# Patient Record
Sex: FEMALE | Race: WHITE | NOT HISPANIC OR LATINO | Employment: OTHER | URBAN - METROPOLITAN AREA
[De-identification: names, ages, dates, MRNs, and addresses within clinical notes are randomized per-mention and may not be internally consistent; named-entity substitution may affect disease eponyms.]

---

## 2017-03-11 ENCOUNTER — LAB (OUTPATIENT)
Dept: FAMILY MEDICINE CLINIC | Facility: CLINIC | Age: 62
End: 2017-03-11

## 2017-03-11 DIAGNOSIS — J45.40 MODERATE PERSISTENT ASTHMA WITHOUT COMPLICATION: ICD-10-CM

## 2017-03-11 DIAGNOSIS — E78.5 DYSLIPIDEMIA: ICD-10-CM

## 2017-03-11 DIAGNOSIS — F90.9 ATTENTION DEFICIT HYPERACTIVITY DISORDER (ADHD), UNSPECIFIED ADHD TYPE: ICD-10-CM

## 2017-03-11 DIAGNOSIS — M85.80 OSTEOPENIA: ICD-10-CM

## 2017-03-11 LAB
25(OH)D3 SERPL-MCNC: 35.7 NG/ML (ref 30–100)
ALBUMIN SERPL-MCNC: 4.3 G/DL (ref 3.5–5.2)
ALBUMIN/GLOB SERPL: 1.7 G/DL
ALP SERPL-CCNC: 52 U/L (ref 39–117)
ALT SERPL W P-5'-P-CCNC: 29 U/L (ref 1–33)
ANION GAP SERPL CALCULATED.3IONS-SCNC: 12.6 MMOL/L
AST SERPL-CCNC: 29 U/L (ref 1–32)
BILIRUB SERPL-MCNC: 0.4 MG/DL (ref 0.1–1.2)
BUN BLD-MCNC: 19 MG/DL (ref 8–23)
BUN/CREAT SERPL: 22.4 (ref 7–25)
CALCIUM SPEC-SCNC: 8.9 MG/DL (ref 8.6–10.5)
CHLORIDE SERPL-SCNC: 103 MMOL/L (ref 98–107)
CHOLEST SERPL-MCNC: 200 MG/DL (ref 0–200)
CO2 SERPL-SCNC: 26.4 MMOL/L (ref 22–29)
CREAT BLD-MCNC: 0.85 MG/DL (ref 0.57–1)
ERYTHROCYTE [DISTWIDTH] IN BLOOD BY AUTOMATED COUNT: 13.1 % (ref 4.5–15)
GFR SERPL CREATININE-BSD FRML MDRD: 68 ML/MIN/1.73
GLOBULIN UR ELPH-MCNC: 2.5 GM/DL
GLUCOSE BLD-MCNC: 97 MG/DL (ref 65–99)
HCT VFR BLD AUTO: 43.1 % (ref 31–42)
HDLC SERPL-MCNC: 88 MG/DL (ref 40–60)
HGB BLD-MCNC: 14.3 G/DL (ref 12–18)
LDLC SERPL CALC-MCNC: 100 MG/DL (ref 0–100)
LDLC/HDLC SERPL: 1.14 {RATIO}
LYMPHOCYTES # BLD AUTO: 0.6 10*3/MM3 (ref 1.2–3.4)
LYMPHOCYTES NFR BLD AUTO: 14.5 % (ref 21–51)
MCH RBC QN AUTO: 29.8 PG (ref 26.1–33.1)
MCHC RBC AUTO-ENTMCNC: 33.3 G/DL (ref 33–37)
MCV RBC AUTO: 89.5 FL (ref 80–99)
MONOCYTES # BLD AUTO: 0.4 10*3/MM3 (ref 0.1–0.6)
MONOCYTES NFR BLD AUTO: 9.7 % (ref 2–9)
NEUTROPHILS # BLD AUTO: 3 10*3/MM3 (ref 1.4–6.5)
NEUTROPHILS NFR BLD AUTO: 75.8 % (ref 42–75)
PLATELET # BLD AUTO: 201 10*3/MM3 (ref 150–450)
PMV BLD AUTO: 8.6 FL (ref 7.1–10.5)
POTASSIUM BLD-SCNC: 4.7 MMOL/L (ref 3.5–5.2)
PROT SERPL-MCNC: 6.8 G/DL (ref 6–8.5)
RBC # BLD AUTO: 4.81 10*6/MM3 (ref 4–6)
SODIUM BLD-SCNC: 142 MMOL/L (ref 136–145)
TRIGL SERPL-MCNC: 59 MG/DL (ref 0–150)
VLDLC SERPL-MCNC: 11.8 MG/DL (ref 5–40)
WBC NRBC COR # BLD: 4 10*3/MM3 (ref 4.5–10)

## 2017-03-11 PROCEDURE — 80061 LIPID PANEL: CPT | Performed by: INTERNAL MEDICINE

## 2017-03-11 PROCEDURE — 82306 VITAMIN D 25 HYDROXY: CPT | Performed by: INTERNAL MEDICINE

## 2017-03-11 PROCEDURE — 85025 COMPLETE CBC W/AUTO DIFF WBC: CPT | Performed by: INTERNAL MEDICINE

## 2017-03-11 PROCEDURE — 80053 COMPREHEN METABOLIC PANEL: CPT | Performed by: INTERNAL MEDICINE

## 2017-03-13 RX ORDER — DEXTROAMPHETAMINE SACCHARATE, AMPHETAMINE ASPARTATE, DEXTROAMPHETAMINE SULFATE AND AMPHETAMINE SULFATE 2.5; 2.5; 2.5; 2.5 MG/1; MG/1; MG/1; MG/1
TABLET ORAL
Qty: 60 TABLET | Refills: 0 | Status: SHIPPED | OUTPATIENT
Start: 2017-03-13 | End: 2017-07-17 | Stop reason: SDUPTHER

## 2017-03-14 ENCOUNTER — TELEPHONE (OUTPATIENT)
Dept: FAMILY MEDICINE CLINIC | Facility: CLINIC | Age: 62
End: 2017-03-14

## 2017-07-05 ENCOUNTER — TELEPHONE (OUTPATIENT)
Dept: FAMILY MEDICINE CLINIC | Facility: CLINIC | Age: 62
End: 2017-07-05

## 2017-07-05 RX ORDER — ZOLPIDEM TARTRATE 10 MG/1
10 TABLET ORAL NIGHTLY PRN
Qty: 30 TABLET | Refills: 3 | Status: SHIPPED | OUTPATIENT
Start: 2017-07-05 | End: 2018-06-25

## 2017-07-05 NOTE — TELEPHONE ENCOUNTER
Recommend [facility] will get 10 free pills 7 and a prescription for refill of one and it also needs coupons for the free medicine and refill needs to follow-up in one month call back if too expensive

## 2017-07-05 NOTE — TELEPHONE ENCOUNTER
PT HAS BEEN HAVING A LOT OF STRESS AT WORK. SHE CAN'T SLEEP FOR IT AND SHE WANTS TO KNOW CAN YOU PLEASE CALL IN A SLEEPING MED TO [facility] AT [facility]? PLEASE CALL

## 2017-07-17 ENCOUNTER — TELEPHONE (OUTPATIENT)
Dept: FAMILY MEDICINE CLINIC | Facility: CLINIC | Age: 62
End: 2017-07-17

## 2017-07-17 RX ORDER — DEXTROAMPHETAMINE SACCHARATE, AMPHETAMINE ASPARTATE, DEXTROAMPHETAMINE SULFATE AND AMPHETAMINE SULFATE 2.5; 2.5; 2.5; 2.5 MG/1; MG/1; MG/1; MG/1
TABLET ORAL
Qty: 60 TABLET | Refills: 0 | Status: SHIPPED | OUTPATIENT
Start: 2017-07-17 | End: 2018-06-26 | Stop reason: SDUPTHER

## 2018-06-25 ENCOUNTER — OFFICE VISIT (OUTPATIENT)
Dept: FAMILY MEDICINE CLINIC | Facility: CLINIC | Age: 63
End: 2018-06-25

## 2018-06-25 VITALS
HEART RATE: 74 BPM | OXYGEN SATURATION: 99 % | BODY MASS INDEX: 22.28 KG/M2 | HEIGHT: 68 IN | SYSTOLIC BLOOD PRESSURE: 90 MMHG | WEIGHT: 147 LBS | DIASTOLIC BLOOD PRESSURE: 82 MMHG | TEMPERATURE: 98.6 F

## 2018-06-25 DIAGNOSIS — R42 DIZZINESS: ICD-10-CM

## 2018-06-25 DIAGNOSIS — R53.82 CHRONIC FATIGUE: ICD-10-CM

## 2018-06-25 DIAGNOSIS — F90.9 ATTENTION DEFICIT HYPERACTIVITY DISORDER (ADHD), UNSPECIFIED ADHD TYPE: Primary | ICD-10-CM

## 2018-06-25 LAB
ALBUMIN SERPL-MCNC: 4.7 G/DL (ref 3.5–5.2)
ALBUMIN/GLOB SERPL: 2 G/DL
ALP SERPL-CCNC: 34 U/L (ref 39–117)
ALT SERPL W P-5'-P-CCNC: 17 U/L (ref 1–33)
ANION GAP SERPL CALCULATED.3IONS-SCNC: 15 MMOL/L
AST SERPL-CCNC: 14 U/L (ref 1–32)
BILIRUB SERPL-MCNC: 0.6 MG/DL (ref 0.1–1.2)
BUN BLD-MCNC: 16 MG/DL (ref 8–23)
BUN/CREAT SERPL: 28.1 (ref 7–25)
CALCIUM SPEC-SCNC: 9.4 MG/DL (ref 8.6–10.5)
CHLORIDE SERPL-SCNC: 99 MMOL/L (ref 98–107)
CO2 SERPL-SCNC: 25 MMOL/L (ref 22–29)
CREAT BLD-MCNC: 0.57 MG/DL (ref 0.57–1)
GFR SERPL CREATININE-BSD FRML MDRD: 107 ML/MIN/1.73
GLOBULIN UR ELPH-MCNC: 2.4 GM/DL
GLUCOSE BLD-MCNC: 86 MG/DL (ref 65–99)
POTASSIUM BLD-SCNC: 4.2 MMOL/L (ref 3.5–5.2)
PROT SERPL-MCNC: 7.1 G/DL (ref 6–8.5)
SODIUM BLD-SCNC: 139 MMOL/L (ref 136–145)
T-UPTAKE NFR SERPL: 1.1 TBI (ref 0.8–1.3)
T4 SERPL-MCNC: 6.81 MCG/DL (ref 4.5–11.7)
TSH SERPL DL<=0.05 MIU/L-ACNC: 1.29 MIU/ML (ref 0.27–4.2)

## 2018-06-25 PROCEDURE — 93000 ELECTROCARDIOGRAM COMPLETE: CPT | Performed by: NURSE PRACTITIONER

## 2018-06-25 PROCEDURE — 80053 COMPREHEN METABOLIC PANEL: CPT | Performed by: NURSE PRACTITIONER

## 2018-06-25 PROCEDURE — 99214 OFFICE O/P EST MOD 30 MIN: CPT | Performed by: NURSE PRACTITIONER

## 2018-06-25 PROCEDURE — 84443 ASSAY THYROID STIM HORMONE: CPT | Performed by: NURSE PRACTITIONER

## 2018-06-25 PROCEDURE — 84436 ASSAY OF TOTAL THYROXINE: CPT | Performed by: NURSE PRACTITIONER

## 2018-06-25 PROCEDURE — 36415 COLL VENOUS BLD VENIPUNCTURE: CPT | Performed by: NURSE PRACTITIONER

## 2018-06-25 PROCEDURE — 84479 ASSAY OF THYROID (T3 OR T4): CPT | Performed by: NURSE PRACTITIONER

## 2018-06-25 NOTE — PROGRESS NOTES
Subjective   Aurora Fisher is a 62 y.o. female.     History of Present Illness   Here to FU on ADHD on adderall 10 mg BID prn, if not working doesn't take and #60 has lasted almost 1 year, Has seen psych in past > 3 years and has been on adderall stable request refill, Works as nurse and helps concentrate, prev had problem with sleepng and tried ambien but no longer takes, With depression and anxiety stable on celexa 20 mg prn and doesn't want to take daily  C/o episode of shakiness, dizziness, and fatigue at home 3 wks ago, no CP, HA, SOA, no sweating or jaw pain, sat down and resolved, then with another episode 1 wk ago with extreme exhaustion and checked BP normal, not associated with meals or exertion, with recent labs 03/1/18 calcium, vit D, CBC, chol well controlled, fam hx of MI and concerned about heart, last EKG 2015 and 2014 here normal, no prev dx of HTN, no prev episode of low BS  Sees Tessa Garner GYN UTD pap on prolia for osteopenia   With asthma stable on advair and albuterol prn and daily singulair 10 mg     The following portions of the patient's history were reviewed and updated as appropriate: allergies, current medications, past family history, past medical history, past social history, past surgical history and problem list.    Review of Systems   Constitutional: Positive for fatigue. Negative for activity change, appetite change, chills, diaphoresis, fever and unexpected weight change.   HENT: Negative for trouble swallowing and voice change.    Respiratory: Negative for cough, shortness of breath and wheezing.    Cardiovascular: Negative for chest pain, palpitations and leg swelling.   Gastrointestinal: Negative for abdominal distention, abdominal pain, anal bleeding, blood in stool, constipation, diarrhea, nausea, rectal pain and vomiting.   Endocrine: Negative for cold intolerance, heat intolerance, polydipsia, polyphagia and polyuria.   Neurological: Positive for dizziness,  weakness and light-headedness. Negative for tremors, seizures, syncope, facial asymmetry, speech difficulty, numbness and headaches.   Psychiatric/Behavioral: Positive for decreased concentration. Negative for agitation, behavioral problems, confusion, dysphoric mood, hallucinations, self-injury, sleep disturbance and suicidal ideas. The patient is not nervous/anxious and is not hyperactive.    All other systems reviewed and are negative.      Objective   Physical Exam   Constitutional: She is oriented to person, place, and time. She appears well-developed and well-nourished.   HENT:   Head: Normocephalic and atraumatic.   Right Ear: Hearing and tympanic membrane normal.   Left Ear: Hearing and tympanic membrane normal.   Nose: Mucosal edema present. Right sinus exhibits no maxillary sinus tenderness and no frontal sinus tenderness. Left sinus exhibits no maxillary sinus tenderness and no frontal sinus tenderness.   Mouth/Throat: No oropharyngeal exudate, posterior oropharyngeal edema or posterior oropharyngeal erythema.   Eyes: Conjunctivae and EOM are normal. Pupils are equal, round, and reactive to light.   Neck: Normal range of motion. Neck supple. No thyromegaly present.   Cardiovascular: Normal rate, regular rhythm and normal heart sounds.    Pulmonary/Chest: Effort normal and breath sounds normal.   Abdominal: Soft. She exhibits no distension and no mass. There is no tenderness. There is no rebound and no guarding. No hernia.   Musculoskeletal: Normal range of motion.   Lymphadenopathy:     She has no cervical adenopathy.   Neurological: She is alert and oriented to person, place, and time.   Skin: Skin is warm and dry.   Psychiatric: She has a normal mood and affect. Her behavior is normal. Judgment and thought content normal.   Vitals reviewed.    ECG 12 Lead  Date/Time: 6/25/2018 5:14 PM  Performed by: TONA PARK  Authorized by: TONA PARK   Comparison: compared with previous ECG  from 12/10/2014  Similar to previous ECG  Rhythm: sinus rhythm  Rate: normal  Conduction: conduction normal  ST Segments: ST segments normal  T Waves: T waves normal  QRS axis: normal  Other: no other findings  Clinical impression: normal ECG          Assessment/Plan   Aurora was seen today for fatigue.    Diagnoses and all orders for this visit:    Attention deficit hyperactivity disorder (ADHD), unspecified ADHD type  -     Compliance Drug Analysis, Ur - Urine, Clean Catch    Chronic fatigue  -     Thyroid Panel With TSH  -     Comprehensive Metabolic Panel    Dizziness    Other orders  -     ECG 12 Lead    check labs and call with results, will discuss adderall with Dr Vazquez, consider viral infection or episodes of low BS and monitor, EKG today NAD, reviewed prev labs 03/18 from GYN, The patient has read and signed the [facility] Controlled Substance Contract UTD today, jamison reviewed and within normal limits.  I will continue to see patient for regular follow up appointments.  They are well controlled on their medication.  JAMISON is updated every 3 months. The patient is aware of the potential for addiction and dependence.

## 2018-06-25 NOTE — PATIENT INSTRUCTIONS
check labs and call with results, will discuss adderall with Dr Vazquez, consider viral infection or episodes of low BS and monitor, EKG today NAD, reviewed prev labs 03/18 from GYN, The patient has read and signed the [facility] Controlled Substance Contract UTD today, rowan reviewed and within normal limits.  I will continue to see patient for regular follow up appointments.  They are well controlled on their medication.  ROWAN is updated every 3 months. The patient is aware of the potential for addiction and dependence.

## 2018-06-26 RX ORDER — DEXTROAMPHETAMINE SACCHARATE, AMPHETAMINE ASPARTATE, DEXTROAMPHETAMINE SULFATE AND AMPHETAMINE SULFATE 2.5; 2.5; 2.5; 2.5 MG/1; MG/1; MG/1; MG/1
TABLET ORAL
Qty: 60 TABLET | Refills: 0 | Status: SHIPPED | OUTPATIENT
Start: 2018-06-26 | End: 2018-10-08 | Stop reason: SDUPTHER

## 2018-06-27 ENCOUNTER — TELEPHONE (OUTPATIENT)
Dept: FAMILY MEDICINE CLINIC | Facility: CLINIC | Age: 63
End: 2018-06-27

## 2018-06-27 NOTE — TELEPHONE ENCOUNTER
Pt informed    ----- Message from BLAYNE June sent at 6/27/2018 10:04 AM EDT -----  BS, kidney, liver, thyroid normal

## 2018-06-29 LAB — CONV REPORT SUMMARY: NORMAL

## 2018-07-19 ENCOUNTER — TELEPHONE (OUTPATIENT)
Dept: GASTROENTEROLOGY | Facility: CLINIC | Age: 63
End: 2018-07-19

## 2018-07-19 NOTE — TELEPHONE ENCOUNTER
Returned patients call. She wanted to know when her next colonoscopy is due. Let her know her recall is May of 2019. She did not have any futher  questions.

## 2018-08-23 ENCOUNTER — OFFICE VISIT (OUTPATIENT)
Dept: FAMILY MEDICINE CLINIC | Facility: CLINIC | Age: 63
End: 2018-08-23

## 2018-08-23 ENCOUNTER — TELEPHONE (OUTPATIENT)
Dept: FAMILY MEDICINE CLINIC | Facility: CLINIC | Age: 63
End: 2018-08-23

## 2018-08-23 VITALS
WEIGHT: 145.2 LBS | HEART RATE: 107 BPM | RESPIRATION RATE: 16 BRPM | HEIGHT: 68 IN | DIASTOLIC BLOOD PRESSURE: 58 MMHG | SYSTOLIC BLOOD PRESSURE: 90 MMHG | TEMPERATURE: 98.4 F | OXYGEN SATURATION: 95 % | BODY MASS INDEX: 22.01 KG/M2
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
## 2018-08-23 PROCEDURE — 99213 OFFICE O/P EST LOW 20 MIN: CPT | Performed by: NURSE PRACTITIONER

## 2018-08-23 RX ORDER — DOXYCYCLINE HYCLATE 100 MG/1
CAPSULE ORAL
COMMUNITY
Start: 2018-08-23 | End: 2018-10-18

## 2018-08-23 RX ORDER — AMOXICILLIN AND CLAVULANATE POTASSIUM 875; 125 MG/1; MG/1
1 TABLET, FILM COATED ORAL 2 TIMES DAILY
Qty: 20 TABLET | Refills: 0 | Status: SHIPPED | OUTPATIENT
Start: 2018-08-23 | End: 2018-09-02

## 2018-08-23 NOTE — TELEPHONE ENCOUNTER
Would recommend her to be seen to make sure it is not infected also received rabies vaccine and tetanus

## 2018-08-23 NOTE — PROGRESS NOTES
Subjective   Aurora Fisher is a 63 y.o. female.     History of Present Illness   C/o cat bite, she states cat is her cat, not UTD on vaccines, states cat has been inside, has not been outdoors recently, she states he bit her left forearm 3 weeks ago, no laceration or lesion noted, she states she has quarantined cat and has been behaving normally since she kept him inside, she is UTD on tdap had in 6/2014, she states she did clean area well. She states bite was yesterday evening, on back of right lower leg, calf region. She denies any drainage, but states redness is getting worse.     The following portions of the patient's history were reviewed and updated as appropriate: allergies, current medications, past family history, past medical history, past social history, past surgical history and problem list.    Review of Systems   Constitutional: Negative for chills, diaphoresis and fever.   Respiratory: Negative for cough and shortness of breath.    Cardiovascular: Negative for chest pain.   Musculoskeletal: Negative for arthralgias and myalgias.   Skin: Positive for color change and skin lesions.   Neurological: Negative for dizziness, light-headedness and headache.   All other systems reviewed and are negative.      Objective   Physical Exam   Constitutional: She is oriented to person, place, and time. She appears well-developed and well-nourished.   HENT:   Head: Normocephalic.   Eyes: Pupils are equal, round, and reactive to light.   Cardiovascular: Normal rate, regular rhythm and normal heart sounds.    Pulmonary/Chest: Effort normal and breath sounds normal.   Musculoskeletal: Normal range of motion.   Neurological: She is alert and oriented to person, place, and time.   Skin: Skin is warm and dry. Abrasion and laceration noted. There is erythema.        Psychiatric: She has a normal mood and affect. Her behavior is normal.   Nursing note and vitals reviewed.        Assessment/Plan   Aurora was seen  today for animal bite.    Diagnoses and all orders for this visit:    Cat bite, initial encounter    Other orders  -     mupirocin (BACTROBAN) 2 % ointment; Apply  topically to the appropriate area as directed 3 (Three) Times a Day.  -     amoxicillin-clavulanate (AUGMENTIN) 875-125 MG per tablet; Take 1 tablet by mouth 2 (Two) Times a Day for 10 days.      Apply bactroban oint to affected area 2x day,   Keep wound clean and dry. Clean with soap and water  Start augmentin 875-125mg 2x day for 10 days, call with any problems  Educated about s/s infection, call with problems or if symptoms persist 5-7 days.  Health department form faxed.   Increase fluid intake, get plenty of rest.   Patient agrees with plan of care and understands instructions. Call if worsening symptoms or any problems or concerns.

## 2018-08-23 NOTE — PATIENT INSTRUCTIONS
Apply bactroban oint to affected area 2x day,   Keep wound clean and dry. Clean with soap and water  Start augmentin 875-125mg 2x day for 10 days, call with any problems  Educated about s/s infection, call with problems or if symptoms persist 5-7 days.  Health department form faxed.   Increase fluid intake, get plenty of rest.   Patient agrees with plan of care and understands instructions. Call if worsening symptoms or any problems or concerns.

## 2018-08-23 NOTE — TELEPHONE ENCOUNTER
PATIENTS CAT BIT HER YESTERDAY AND CATS RABIE SHOTS WAS April 2014. CAT IS AN INSIDE CAT. PATIENT WANTS TO KNOW IF SHE NEEDS TO BE SEEN

## 2018-09-27 ENCOUNTER — TELEPHONE (OUTPATIENT)
Dept: GASTROENTEROLOGY | Facility: CLINIC | Age: 63
End: 2018-09-27

## 2018-09-27 NOTE — TELEPHONE ENCOUNTER
Patient called wanting to do a colonoscopy. Explained to her her last colonoscopy was 6-2014. She is not do for a colonoscopy till 6-2019. Explained if she has doucmenation of a change in bowels. Insurance might pay for colonoscopy sooner. She will need to contact insurance to see what they tell her. I am happy to schedule her a colonoscopy at any time.

## 2018-10-01 ENCOUNTER — TELEPHONE (OUTPATIENT)
Dept: FAMILY MEDICINE CLINIC | Facility: CLINIC | Age: 63
End: 2018-10-01

## 2018-10-01 NOTE — TELEPHONE ENCOUNTER
PT CALLING SHE NEEDS HER ADDERAL 10 MG CALLED INTO [phone]  IF THERE IS A PROBLEM PLEASE CALL HER [phone]

## 2018-10-04 ENCOUNTER — TELEPHONE (OUTPATIENT)
Dept: FAMILY MEDICINE CLINIC | Facility: CLINIC | Age: 63
End: 2018-10-04

## 2018-10-08 RX ORDER — DEXTROAMPHETAMINE SACCHARATE, AMPHETAMINE ASPARTATE, DEXTROAMPHETAMINE SULFATE AND AMPHETAMINE SULFATE 2.5; 2.5; 2.5; 2.5 MG/1; MG/1; MG/1; MG/1
TABLET ORAL
Qty: 60 TABLET | Refills: 0 | Status: SHIPPED | OUTPATIENT
Start: 2018-10-08 | End: 2018-11-02 | Stop reason: SDUPTHER

## 2018-10-18 ENCOUNTER — OFFICE VISIT (OUTPATIENT)
Dept: FAMILY MEDICINE CLINIC | Facility: CLINIC | Age: 63
End: 2018-10-18

## 2018-10-18 VITALS
BODY MASS INDEX: 22.58 KG/M2 | HEIGHT: 68 IN | WEIGHT: 149 LBS | TEMPERATURE: 98.8 F | RESPIRATION RATE: 15 BRPM | DIASTOLIC BLOOD PRESSURE: 70 MMHG | HEART RATE: 63 BPM | OXYGEN SATURATION: 99 % | SYSTOLIC BLOOD PRESSURE: 104 MMHG

## 2018-10-18 DIAGNOSIS — F90.2 ATTENTION DEFICIT HYPERACTIVITY DISORDER (ADHD), COMBINED TYPE: ICD-10-CM

## 2018-10-18 DIAGNOSIS — Z00.00 PHYSICAL EXAM, ANNUAL: Primary | ICD-10-CM

## 2018-10-18 DIAGNOSIS — E78.5 DYSLIPIDEMIA: ICD-10-CM

## 2018-10-18 PROCEDURE — 99396 PREV VISIT EST AGE 40-64: CPT | Performed by: INTERNAL MEDICINE

## 2018-10-18 RX ORDER — MONTELUKAST SODIUM 10 MG/1
10 TABLET ORAL NIGHTLY
Qty: 90 TABLET | Refills: 1 | Status: SHIPPED | OUTPATIENT
Start: 2018-10-18 | End: 2019-07-29 | Stop reason: SDUPTHER

## 2018-10-18 RX ORDER — PHENOL 1.4 %
600 AEROSOL, SPRAY (ML) MUCOUS MEMBRANE DAILY
COMMUNITY

## 2018-10-18 RX ORDER — MELATONIN
1000 2 TIMES DAILY
COMMUNITY

## 2018-10-18 NOTE — PROGRESS NOTES
Subjective   Aurora Fisher is a 63 y.o. female.     History of Present Illness   She was seen for a physical.  Her every level and diet were discussed.  A she has extensive family history of colonic polyps and colon cancer.  Her grandfather  of colon cancer her mother and sister had multiple polyps.  It is recommended patient have colonoscopy every 5 years.    Dictated utilizing Dragon dictation. If there are questions or for further clarification, please contact me.   The following portions of the patient's history were reviewed and updated as appropriate: allergies, current medications, past family history, past medical history, past social history, past surgical history and problem list.    Review of Systems   Constitutional: Negative for fatigue and fever.   HENT: Positive for congestion. Negative for trouble swallowing.    Eyes: Negative for discharge and visual disturbance.   Respiratory: Negative for choking and shortness of breath.    Cardiovascular: Negative for chest pain and palpitations.   Gastrointestinal: Negative for abdominal pain and blood in stool.   Endocrine: Negative.    Genitourinary: Negative for genital sores and hematuria.   Musculoskeletal: Negative for gait problem and joint swelling.   Skin: Negative for color change, pallor, rash and wound.   Allergic/Immunologic: Positive for environmental allergies. Negative for immunocompromised state.   Neurological: Negative for facial asymmetry and speech difficulty.   Psychiatric/Behavioral: Negative for hallucinations and suicidal ideas.       Objective   Physical Exam   Constitutional: She is oriented to person, place, and time. She appears well-developed and well-nourished. No distress.   HENT:   Head: Normocephalic and atraumatic.   Right Ear: External ear normal.   Left Ear: External ear normal.   Nose: Nose normal.   Mouth/Throat: Oropharynx is clear and moist. No oropharyngeal exudate.   Eyes: Pupils are equal, round, and reactive  to light. Conjunctivae and EOM are normal. Right eye exhibits no discharge. Left eye exhibits no discharge. No scleral icterus.   Neck: Normal range of motion. Neck supple. No JVD present. No tracheal deviation present. No thyromegaly present.   Cardiovascular: Normal rate, regular rhythm and normal heart sounds.  Exam reveals no gallop and no friction rub.    No murmur heard.  Pulmonary/Chest: Effort normal and breath sounds normal. No stridor. She has no wheezes. She has no rales. She exhibits no tenderness.   Abdominal: Soft. Bowel sounds are normal. She exhibits no distension and no mass. There is no tenderness. There is no rebound and no guarding. No hernia.   Musculoskeletal: Normal range of motion. She exhibits no edema, tenderness or deformity.   Lymphadenopathy:     She has no cervical adenopathy.   Neurological: She is alert and oriented to person, place, and time. She displays normal reflexes. No sensory deficit. She exhibits normal muscle tone. Coordination normal.   Skin: Skin is warm and dry. No rash noted. She is not diaphoretic. No erythema. No pallor.   Psychiatric: She has a normal mood and affect. Her behavior is normal. Judgment and thought content normal.   Nursing note and vitals reviewed.      Assessment/Plan   Problems Addressed this Visit        Other    ADHD (attention deficit hyperactivity disorder)    Relevant Orders    CBC & Differential    Comprehensive Metabolic Panel    Lipid Panel    Vitamin D 25 Hydroxy    Dyslipidemia    Relevant Orders    CBC & Differential    Comprehensive Metabolic Panel    Lipid Panel    Vitamin D 25 Hydroxy      Other Visit Diagnoses     Physical exam, annual    -  Primary    Relevant Orders    CBC & Differential    Comprehensive Metabolic Panel    Lipid Panel    Vitamin D 25 Hydroxy

## 2018-10-22 LAB
25(OH)D3 SERPL-MCNC: 51.9 NG/ML (ref 30–100)
ALBUMIN SERPL-MCNC: 5 G/DL (ref 3.5–5.2)
ALBUMIN/GLOB SERPL: 2.2 G/DL
ALP SERPL-CCNC: 37 U/L (ref 39–117)
ALT SERPL W P-5'-P-CCNC: 16 U/L (ref 1–33)
ANION GAP SERPL CALCULATED.3IONS-SCNC: 11.7 MMOL/L
AST SERPL-CCNC: 18 U/L (ref 1–32)
BASOPHILS # BLD AUTO: 0.02 10*3/MM3 (ref 0–0.2)
BASOPHILS NFR BLD AUTO: 0.5 % (ref 0–1.5)
BILIRUB SERPL-MCNC: 0.4 MG/DL (ref 0.1–1.2)
BUN BLD-MCNC: 23 MG/DL (ref 8–23)
BUN/CREAT SERPL: 26.7 (ref 7–25)
CALCIUM SPEC-SCNC: 9.5 MG/DL (ref 8.6–10.5)
CHLORIDE SERPL-SCNC: 102 MMOL/L (ref 98–107)
CHOLEST SERPL-MCNC: 233 MG/DL (ref 0–200)
CO2 SERPL-SCNC: 26.3 MMOL/L (ref 22–29)
CREAT BLD-MCNC: 0.86 MG/DL (ref 0.57–1)
DEPRECATED RDW RBC AUTO: 44.3 FL (ref 37–54)
EOSINOPHIL # BLD AUTO: 0.24 10*3/MM3 (ref 0–0.7)
EOSINOPHIL NFR BLD AUTO: 5.9 % (ref 0.3–6.2)
ERYTHROCYTE [DISTWIDTH] IN BLOOD BY AUTOMATED COUNT: 13 % (ref 11.7–13)
GFR SERPL CREATININE-BSD FRML MDRD: 66 ML/MIN/1.73
GLOBULIN UR ELPH-MCNC: 2.3 GM/DL
GLUCOSE BLD-MCNC: 81 MG/DL (ref 65–99)
HCT VFR BLD AUTO: 45.9 % (ref 35.6–45.5)
HDLC SERPL-MCNC: 82 MG/DL (ref 40–60)
HGB BLD-MCNC: 14.8 G/DL (ref 11.9–15.5)
IMM GRANULOCYTES # BLD: 0.02 10*3/MM3 (ref 0–0.03)
IMM GRANULOCYTES NFR BLD: 0.5 % (ref 0–0.5)
LDLC SERPL CALC-MCNC: 140 MG/DL (ref 0–100)
LDLC/HDLC SERPL: 1.71 {RATIO}
LYMPHOCYTES # BLD AUTO: 1.31 10*3/MM3 (ref 0.9–4.8)
LYMPHOCYTES NFR BLD AUTO: 32.4 % (ref 19.6–45.3)
MCH RBC QN AUTO: 30.3 PG (ref 26.9–32)
MCHC RBC AUTO-ENTMCNC: 32.2 G/DL (ref 32.4–36.3)
MCV RBC AUTO: 93.9 FL (ref 80.5–98.2)
MONOCYTES # BLD AUTO: 0.39 10*3/MM3 (ref 0.2–1.2)
MONOCYTES NFR BLD AUTO: 9.7 % (ref 5–12)
NEUTROPHILS # BLD AUTO: 2.08 10*3/MM3 (ref 1.9–8.1)
NEUTROPHILS NFR BLD AUTO: 51.5 % (ref 42.7–76)
PLATELET # BLD AUTO: 284 10*3/MM3 (ref 140–500)
PMV BLD AUTO: 10.9 FL (ref 6–12)
POTASSIUM BLD-SCNC: 4.9 MMOL/L (ref 3.5–5.2)
PROT SERPL-MCNC: 7.3 G/DL (ref 6–8.5)
RBC # BLD AUTO: 4.89 10*6/MM3 (ref 3.9–5.2)
SODIUM BLD-SCNC: 140 MMOL/L (ref 136–145)
TRIGL SERPL-MCNC: 54 MG/DL (ref 0–150)
VLDLC SERPL-MCNC: 10.8 MG/DL (ref 5–40)
WBC NRBC COR # BLD: 4.04 10*3/MM3 (ref 4.5–10.7)

## 2018-10-22 PROCEDURE — 85025 COMPLETE CBC W/AUTO DIFF WBC: CPT | Performed by: INTERNAL MEDICINE

## 2018-10-22 PROCEDURE — 80053 COMPREHEN METABOLIC PANEL: CPT | Performed by: INTERNAL MEDICINE

## 2018-10-22 PROCEDURE — 80061 LIPID PANEL: CPT | Performed by: INTERNAL MEDICINE

## 2018-10-22 PROCEDURE — 82306 VITAMIN D 25 HYDROXY: CPT | Performed by: INTERNAL MEDICINE

## 2018-11-02 ENCOUNTER — TELEPHONE (OUTPATIENT)
Dept: FAMILY MEDICINE CLINIC | Facility: CLINIC | Age: 63
End: 2018-11-02

## 2018-11-02 RX ORDER — DEXTROAMPHETAMINE SACCHARATE, AMPHETAMINE ASPARTATE, DEXTROAMPHETAMINE SULFATE AND AMPHETAMINE SULFATE 2.5; 2.5; 2.5; 2.5 MG/1; MG/1; MG/1; MG/1
TABLET ORAL
Qty: 60 TABLET | Refills: 0 | Status: SHIPPED | OUTPATIENT
Start: 2018-11-02 | End: 2018-12-07 | Stop reason: SDUPTHER

## 2018-12-07 ENCOUNTER — TELEPHONE (OUTPATIENT)
Dept: FAMILY MEDICINE CLINIC | Facility: CLINIC | Age: 63
End: 2018-12-07

## 2018-12-07 RX ORDER — DEXTROAMPHETAMINE SACCHARATE, AMPHETAMINE ASPARTATE, DEXTROAMPHETAMINE SULFATE AND AMPHETAMINE SULFATE 2.5; 2.5; 2.5; 2.5 MG/1; MG/1; MG/1; MG/1
TABLET ORAL
Qty: 60 TABLET | Refills: 0 | Status: SHIPPED | OUTPATIENT
Start: 2018-12-07 | End: 2019-07-29

## 2018-12-07 NOTE — TELEPHONE ENCOUNTER
PATIENT HAD HER LABS DONE AND HER LDL IS ELAVATED PATIENT WANTS TO KNOW IF SHE SHOULD BE ON MEDICATION FOR IT

## 2018-12-07 NOTE — TELEPHONE ENCOUNTER
Needs refill on her adderall wants it sent to [facility] today BC she is out of her medicine now

## 2019-01-18 ENCOUNTER — TELEPHONE (OUTPATIENT)
Dept: FAMILY MEDICINE CLINIC | Facility: CLINIC | Age: 64
End: 2019-01-18

## 2019-01-18 DIAGNOSIS — E78.2 MIXED HYPERLIPIDEMIA: Primary | ICD-10-CM

## 2019-01-18 RX ORDER — ATORVASTATIN CALCIUM 40 MG/1
40 TABLET, FILM COATED ORAL DAILY
Qty: 30 TABLET | Refills: 3 | Status: SHIPPED | OUTPATIENT
Start: 2019-01-18 | End: 2019-07-29 | Stop reason: SDUPTHER

## 2019-01-18 NOTE — TELEPHONE ENCOUNTER
PATIENTS CHOLESTEROL IS UP AND WANTS TO KNOW IF SHE NEEDS TO BE ON MEDICATION. PATIENT HAS FAMILY HISTORY OF HIGH CHOLESTEROL

## 2019-01-21 NOTE — TELEPHONE ENCOUNTER
Patient informed she wanted to know if there is a better cholesterol medicine than others she was thinking of crestor but wanted your opinion I already told her you prescribed liptor and would not be back in office until next Tuesday.

## 2019-02-14 ENCOUNTER — TELEPHONE (OUTPATIENT)
Dept: FAMILY MEDICINE CLINIC | Facility: CLINIC | Age: 64
End: 2019-02-14

## 2019-02-14 NOTE — TELEPHONE ENCOUNTER
Would like her cholesterol numbers again before she starts lipitor, she said you can call or email at   [email or web address]

## 2019-03-20 ENCOUNTER — PREP FOR SURGERY (OUTPATIENT)
Dept: OTHER | Facility: HOSPITAL | Age: 64
End: 2019-03-20

## 2019-03-20 ENCOUNTER — TELEPHONE (OUTPATIENT)
Dept: GASTROENTEROLOGY | Facility: CLINIC | Age: 64
End: 2019-03-20

## 2019-03-20 DIAGNOSIS — Z86.010 HISTORY OF COLON POLYPS: Primary | ICD-10-CM

## 2019-03-20 PROBLEM — Z86.0100 HISTORY OF COLON POLYPS: Status: ACTIVE | Noted: 2019-03-20

## 2019-03-20 RX ORDER — SODIUM CHLORIDE, SODIUM LACTATE, POTASSIUM CHLORIDE, CALCIUM CHLORIDE 600; 310; 30; 20 MG/100ML; MG/100ML; MG/100ML; MG/100ML
30 INJECTION, SOLUTION INTRAVENOUS CONTINUOUS
Status: CANCELLED | OUTPATIENT
Start: 2019-05-31

## 2019-03-20 NOTE — TELEPHONE ENCOUNTER
Patient called did Open Access health history paper work mailed. Colonoscopy scheduled for 4-18-19. She is to arrive at 8 am. Patient has recall colonoscopy do in June, 2019. She wants to do it now as she is unemployed. She has [employer]. Explained to her I was not sure if insurance will cove since she is doing colonoscopy early. She did mention she is having some rectal bleeding. There is no documentation of this. Colonoscopy scheduled for 4-18-19.

## 2019-03-26 ENCOUNTER — PREP FOR SURGERY (OUTPATIENT)
Dept: OTHER | Facility: HOSPITAL | Age: 64
End: 2019-03-26

## 2019-04-03 ENCOUNTER — TELEPHONE (OUTPATIENT)
Dept: GASTROENTEROLOGY | Facility: CLINIC | Age: 64
End: 2019-04-03

## 2019-04-03 NOTE — TELEPHONE ENCOUNTER
----- Message from Emely Aguuste RN sent at 4/2/2019 12:31 PM EDT -----  Regarding: RESCHED CSCOPE  Contact: [phone]  She has colonoscopy sched 4/18/19 but needs to resched because her sister is having surgery.  Returned patients call. Moved her procedure from 4-18-19. To 4-26-19. She will arrive at 7.30 am.

## 2019-04-19 ENCOUNTER — TELEPHONE (OUTPATIENT)
Dept: GASTROENTEROLOGY | Facility: CLINIC | Age: 64
End: 2019-04-19

## 2019-04-25 ENCOUNTER — TELEPHONE (OUTPATIENT)
Dept: GASTROENTEROLOGY | Facility: CLINIC | Age: 64
End: 2019-04-25

## 2019-04-25 NOTE — TELEPHONE ENCOUNTER
Patient called moved procedure from 4-26-19 to 5-31-19. She will arrive at 8.30 am. She is feeling worse today. Moved her with Inga PICKENS.

## 2019-05-24 ENCOUNTER — TELEPHONE (OUTPATIENT)
Dept: GASTROENTEROLOGY | Facility: CLINIC | Age: 64
End: 2019-05-24

## 2019-05-31 ENCOUNTER — HOSPITAL ENCOUNTER (OUTPATIENT)
Facility: HOSPITAL | Age: 64
Setting detail: HOSPITAL OUTPATIENT SURGERY
Discharge: HOME | End: 2019-05-31
Attending: INTERNAL MEDICINE | Admitting: INTERNAL MEDICINE

## 2019-05-31 ENCOUNTER — ANESTHESIA (OUTPATIENT)
Dept: GASTROENTEROLOGY | Facility: HOSPITAL | Age: 64
End: 2019-05-31

## 2019-05-31 ENCOUNTER — ANESTHESIA EVENT (OUTPATIENT)
Dept: GASTROENTEROLOGY | Facility: HOSPITAL | Age: 64
End: 2019-05-31

## 2019-05-31 VITALS
HEIGHT: 68 IN | WEIGHT: 158 LBS | HEART RATE: 78 BPM | RESPIRATION RATE: 16 BRPM | BODY MASS INDEX: 23.95 KG/M2 | DIASTOLIC BLOOD PRESSURE: 76 MMHG | OXYGEN SATURATION: 95 % | SYSTOLIC BLOOD PRESSURE: 105 MMHG

## 2019-05-31 DIAGNOSIS — Z86.010 HISTORY OF COLON POLYPS: ICD-10-CM

## 2019-05-31 PROCEDURE — 45378 DIAGNOSTIC COLONOSCOPY: CPT | Performed by: INTERNAL MEDICINE

## 2019-05-31 PROCEDURE — 25010000002 PROPOFOL 10 MG/ML EMULSION: Performed by: NURSE ANESTHETIST, CERTIFIED REGISTERED

## 2019-05-31 RX ORDER — LIDOCAINE HYDROCHLORIDE 20 MG/ML
INJECTION, SOLUTION INFILTRATION; PERINEURAL AS NEEDED
Status: DISCONTINUED | OUTPATIENT
Start: 2019-05-31 | End: 2019-05-31 | Stop reason: SURG

## 2019-05-31 RX ORDER — ASPIRIN 81 MG/1
81 TABLET, CHEWABLE ORAL DAILY
COMMUNITY
End: 2021-11-04

## 2019-05-31 RX ORDER — SODIUM CHLORIDE, SODIUM LACTATE, POTASSIUM CHLORIDE, CALCIUM CHLORIDE 600; 310; 30; 20 MG/100ML; MG/100ML; MG/100ML; MG/100ML
30 INJECTION, SOLUTION INTRAVENOUS CONTINUOUS
Status: DISCONTINUED | OUTPATIENT
Start: 2019-05-31 | End: 2019-05-31 | Stop reason: HOSPADM

## 2019-05-31 RX ORDER — PROPOFOL 10 MG/ML
VIAL (ML) INTRAVENOUS CONTINUOUS PRN
Status: DISCONTINUED | OUTPATIENT
Start: 2019-05-31 | End: 2019-05-31 | Stop reason: SURG

## 2019-05-31 RX ORDER — PROPOFOL 10 MG/ML
VIAL (ML) INTRAVENOUS AS NEEDED
Status: DISCONTINUED | OUTPATIENT
Start: 2019-05-31 | End: 2019-05-31 | Stop reason: SURG

## 2019-05-31 RX ORDER — SODIUM CHLORIDE, SODIUM LACTATE, POTASSIUM CHLORIDE, CALCIUM CHLORIDE 600; 310; 30; 20 MG/100ML; MG/100ML; MG/100ML; MG/100ML
30 INJECTION, SOLUTION INTRAVENOUS CONTINUOUS PRN
Status: DISCONTINUED | OUTPATIENT
Start: 2019-05-31 | End: 2019-05-31 | Stop reason: HOSPADM

## 2019-05-31 RX ADMIN — PROPOFOL 100 MG: 10 INJECTION, EMULSION INTRAVENOUS at 11:23

## 2019-05-31 RX ADMIN — LIDOCAINE HYDROCHLORIDE 60 MG: 20 INJECTION, SOLUTION INFILTRATION; PERINEURAL at 11:18

## 2019-05-31 RX ADMIN — SODIUM CHLORIDE, POTASSIUM CHLORIDE, SODIUM LACTATE AND CALCIUM CHLORIDE 30 ML/HR: 600; 310; 30; 20 INJECTION, SOLUTION INTRAVENOUS at 10:05

## 2019-05-31 RX ADMIN — PROPOFOL 50 MG: 10 INJECTION, EMULSION INTRAVENOUS at 11:24

## 2019-05-31 RX ADMIN — PROPOFOL 300 MCG/KG/MIN: 10 INJECTION, EMULSION INTRAVENOUS at 11:24

## 2019-05-31 NOTE — ANESTHESIA POSTPROCEDURE EVALUATION
"Patient: Aurora Fisher    Procedure Summary     Date:  05/31/19 Room / Location:  [facility] ENDOSCOPY 1 /  [facility]    Anesthesia Start:  1114 Anesthesia Stop:  1146    Procedure:  COLONOSCOPY into cecum (N/A ) Diagnosis:       History of colon polyps      (History of colon polyps [Z86.010])    Surgeon:  Sid Menard MD Provider:  Nicole Miller MD    Anesthesia Type:  MAC ASA Status:  2          Anesthesia Type: MAC  Last vitals  BP   105/76 (05/31/19 1204)   Temp       Pulse   78 (05/31/19 1204)   Resp   16 (05/31/19 1204)     SpO2   95 % (05/31/19 1204)     Post Anesthesia Care and Evaluation    Patient location during evaluation: PACU  Patient participation: complete - patient participated  Level of consciousness: awake  Pain score: 0  Pain management: adequate  Airway patency: patent  Anesthetic complications: No anesthetic complications    Cardiovascular status: acceptable  Respiratory status: acceptable  Hydration status: acceptable    Comments: Blood pressure 105/76, pulse 78, resp. rate 16, height 172.7 cm (68\"), weight 71.7 kg (158 lb), SpO2 95 %.    No anesthesia care post op    "

## 2019-05-31 NOTE — ANESTHESIA PREPROCEDURE EVALUATION
Anesthesia Evaluation     Patient summary reviewed and Nursing notes reviewed   NPO Solid Status: > 8 hours  NPO Liquid Status: > 8 hours           Airway   Mallampati: I  TM distance: >3 FB  Neck ROM: full  No difficulty expected  Dental - normal exam     Pulmonary - negative pulmonary ROS and normal exam   Cardiovascular - negative cardio ROS and normal exam        Neuro/Psych- negative ROS  GI/Hepatic/Renal/Endo - negative ROS     Musculoskeletal (-) negative ROS    Abdominal    Substance History - negative use     OB/GYN negative ob/gyn ROS         Other                        Anesthesia Plan    ASA 2     MAC     Anesthetic plan, all risks, benefits, and alternatives have been provided, discussed and informed consent has been obtained with: patient.

## 2019-07-25 ENCOUNTER — TELEPHONE (OUTPATIENT)
Dept: FAMILY MEDICINE CLINIC | Facility: CLINIC | Age: 64
End: 2019-07-25

## 2019-07-25 NOTE — TELEPHONE ENCOUNTER
Informed patient hasn't been in since October, needs visit,UDS,Contract to get refill on singular lipitor adderal advair provential inhaler. Scheduling appt informed may have to see some one else

## 2019-07-29 ENCOUNTER — OFFICE VISIT (OUTPATIENT)
Dept: FAMILY MEDICINE CLINIC | Facility: CLINIC | Age: 64
End: 2019-07-29

## 2019-07-29 VITALS
HEIGHT: 68 IN | BODY MASS INDEX: 23.78 KG/M2 | SYSTOLIC BLOOD PRESSURE: 102 MMHG | WEIGHT: 156.9 LBS | DIASTOLIC BLOOD PRESSURE: 60 MMHG | TEMPERATURE: 98.5 F | HEART RATE: 75 BPM | OXYGEN SATURATION: 97 %

## 2019-07-29 DIAGNOSIS — E78.5 HYPERLIPIDEMIA, UNSPECIFIED HYPERLIPIDEMIA TYPE: ICD-10-CM

## 2019-07-29 DIAGNOSIS — F90.0 ATTENTION DEFICIT HYPERACTIVITY DISORDER (ADHD), PREDOMINANTLY INATTENTIVE TYPE: Primary | ICD-10-CM

## 2019-07-29 DIAGNOSIS — Z51.81 MEDICATION MONITORING ENCOUNTER: ICD-10-CM

## 2019-07-29 LAB
ALBUMIN SERPL-MCNC: 4.9 G/DL (ref 3.5–5.2)
ALBUMIN/GLOB SERPL: 2 G/DL
ALP SERPL-CCNC: 39 U/L (ref 39–117)
ALT SERPL W P-5'-P-CCNC: 17 U/L (ref 1–33)
ANION GAP SERPL CALCULATED.3IONS-SCNC: 13.5 MMOL/L (ref 5–15)
AST SERPL-CCNC: 18 U/L (ref 1–32)
BILIRUB SERPL-MCNC: 0.7 MG/DL (ref 0.2–1.2)
BUN BLD-MCNC: 16 MG/DL (ref 8–23)
BUN/CREAT SERPL: 23.9 (ref 7–25)
CALCIUM SPEC-SCNC: 9.4 MG/DL (ref 8.6–10.5)
CHLORIDE SERPL-SCNC: 102 MMOL/L (ref 98–107)
CO2 SERPL-SCNC: 25.5 MMOL/L (ref 22–29)
CREAT BLD-MCNC: 0.67 MG/DL (ref 0.57–1)
ERYTHROCYTE [DISTWIDTH] IN BLOOD BY AUTOMATED COUNT: 13.1 % (ref 12.3–15.4)
GFR SERPL CREATININE-BSD FRML MDRD: 89 ML/MIN/1.73
GLOBULIN UR ELPH-MCNC: 2.4 GM/DL
GLUCOSE BLD-MCNC: 95 MG/DL (ref 65–99)
HCT VFR BLD AUTO: 44.6 % (ref 34–46.6)
HGB BLD-MCNC: 14.9 G/DL (ref 12–15.9)
LYMPHOCYTES # BLD AUTO: 1.5 10*3/MM3 (ref 0.7–3.1)
LYMPHOCYTES NFR BLD AUTO: 27.4 % (ref 19.6–45.3)
MCH RBC QN AUTO: 30.2 PG (ref 26.6–33)
MCHC RBC AUTO-ENTMCNC: 33.5 G/DL (ref 31.5–35.7)
MCV RBC AUTO: 90.3 FL (ref 79–97)
MONOCYTES # BLD AUTO: 0.5 10*3/MM3 (ref 0.1–0.9)
MONOCYTES NFR BLD AUTO: 9.9 % (ref 5–12)
NEUTROPHILS # BLD AUTO: 3.4 10*3/MM3 (ref 1.7–7)
NEUTROPHILS NFR BLD AUTO: 62.7 % (ref 42.7–76)
PLATELET # BLD AUTO: 268 10*3/MM3 (ref 140–450)
PMV BLD AUTO: 7.1 FL (ref 6–12)
POTASSIUM BLD-SCNC: 4.2 MMOL/L (ref 3.5–5.2)
PROT SERPL-MCNC: 7.3 G/DL (ref 6–8.5)
RBC # BLD AUTO: 4.93 10*6/MM3 (ref 3.77–5.28)
SODIUM BLD-SCNC: 141 MMOL/L (ref 136–145)
WBC NRBC COR # BLD: 5.4 10*3/MM3 (ref 3.4–10.8)

## 2019-07-29 PROCEDURE — 80053 COMPREHEN METABOLIC PANEL: CPT | Performed by: INTERNAL MEDICINE

## 2019-07-29 PROCEDURE — 36415 COLL VENOUS BLD VENIPUNCTURE: CPT | Performed by: INTERNAL MEDICINE

## 2019-07-29 PROCEDURE — 85025 COMPLETE CBC W/AUTO DIFF WBC: CPT | Performed by: INTERNAL MEDICINE

## 2019-07-29 PROCEDURE — 99214 OFFICE O/P EST MOD 30 MIN: CPT | Performed by: INTERNAL MEDICINE

## 2019-07-29 RX ORDER — ESTRADIOL 2 MG/1
RING VAGINAL AS NEEDED
COMMUNITY
Start: 2019-07-03 | End: 2021-11-04

## 2019-07-29 RX ORDER — ACYCLOVIR 400 MG/1
400 TABLET ORAL DAILY
Qty: 90 TABLET | Refills: 3 | Status: SHIPPED | OUTPATIENT
Start: 2019-07-29 | End: 2019-07-30 | Stop reason: SDUPTHER

## 2019-07-29 RX ORDER — ATORVASTATIN CALCIUM 40 MG/1
40 TABLET, FILM COATED ORAL DAILY
Qty: 90 TABLET | Refills: 3 | Status: SHIPPED | OUTPATIENT
Start: 2019-07-29 | End: 2020-02-26 | Stop reason: SDUPTHER

## 2019-07-29 RX ORDER — ACYCLOVIR 400 MG/1
400 TABLET ORAL DAILY
COMMUNITY
End: 2019-07-29 | Stop reason: SDUPTHER

## 2019-07-29 RX ORDER — PRAMIPEXOLE DIHYDROCHLORIDE 1 MG/1
1 TABLET ORAL DAILY
Qty: 90 TABLET | Refills: 3 | Status: SHIPPED | OUTPATIENT
Start: 2019-07-29 | End: 2019-07-29 | Stop reason: SDUPTHER

## 2019-07-29 RX ORDER — MONTELUKAST SODIUM 10 MG/1
10 TABLET ORAL NIGHTLY
Qty: 90 TABLET | Refills: 3 | Status: SHIPPED | OUTPATIENT
Start: 2019-07-29 | End: 2022-11-16

## 2019-07-29 RX ORDER — ALBUTEROL SULFATE 90 UG/1
2 AEROSOL, METERED RESPIRATORY (INHALATION) EVERY 6 HOURS PRN
Qty: 3 INHALER | Refills: 3 | Status: SHIPPED | OUTPATIENT
Start: 2019-07-29 | End: 2019-07-29 | Stop reason: SDUPTHER

## 2019-07-29 RX ORDER — ACYCLOVIR 400 MG/1
400 TABLET ORAL DAILY
Qty: 90 TABLET | Refills: 3 | Status: SHIPPED | OUTPATIENT
Start: 2019-07-29 | End: 2019-07-29 | Stop reason: SDUPTHER

## 2019-07-29 RX ORDER — MONTELUKAST SODIUM 10 MG/1
10 TABLET ORAL NIGHTLY
Qty: 90 TABLET | Refills: 3 | Status: SHIPPED | OUTPATIENT
Start: 2019-07-29 | End: 2019-07-29 | Stop reason: SDUPTHER

## 2019-07-29 RX ORDER — ATORVASTATIN CALCIUM 40 MG/1
40 TABLET, FILM COATED ORAL DAILY
Qty: 90 TABLET | Refills: 3 | Status: SHIPPED | OUTPATIENT
Start: 2019-07-29 | End: 2019-07-29 | Stop reason: SDUPTHER

## 2019-07-29 RX ORDER — PRAMIPEXOLE DIHYDROCHLORIDE 1 MG/1
1 TABLET ORAL DAILY
Qty: 90 TABLET | Refills: 3 | Status: SHIPPED | OUTPATIENT
Start: 2019-07-29 | End: 2022-11-16

## 2019-07-29 RX ORDER — ALBUTEROL SULFATE 90 UG/1
2 AEROSOL, METERED RESPIRATORY (INHALATION) EVERY 6 HOURS PRN
Qty: 3 INHALER | Refills: 3 | Status: SHIPPED | OUTPATIENT
Start: 2019-07-29

## 2019-07-29 RX ORDER — DEXTROAMPHETAMINE SACCHARATE, AMPHETAMINE ASPARTATE, DEXTROAMPHETAMINE SULFATE AND AMPHETAMINE SULFATE 2.5; 2.5; 2.5; 2.5 MG/1; MG/1; MG/1; MG/1
TABLET ORAL
Qty: 60 TABLET | Refills: 0 | Status: SHIPPED | OUTPATIENT
Start: 2019-07-29 | End: 2021-11-04

## 2019-07-29 NOTE — PROGRESS NOTES
Subjective   Aurora Fisher is a 63 y.o. female.   Patient with known ADD and needs medication monitoring for above.  Is still works fairly well.  Needs lipids checked as well.  History of Present Illness   ADD well controlled on present dose of Adderall.  Needs medication monitoring for this.  Wish to have this done and sees going to be in between jobs in the very near future.  Lipids to be rechecked as well.  History of hyperlipidemia no other complaints at this point time tolerating medications well breathing is satisfactory as well osteopenia being monitored currently is on Prolia for this.  Patient is non-smoker regalis her penicillin causing rash sulfa causing rash.  Family history i noncontributory.  Review of Systems   All other systems reviewed and are negative.      Objective   Vitals:    07/29/19 1427   BP: 102/60   Pulse: 75   Temp: 98.5 °F (36.9 °C)   SpO2: 97%   Weight: 71.2 kg (156 lb 14.4 oz)     Physical Exam   Constitutional: She appears well-developed and well-nourished.   HENT:   Head: Normocephalic and atraumatic.   Eyes: Conjunctivae are normal. Pupils are equal, round, and reactive to light.   Cardiovascular: Normal rate, regular rhythm and normal heart sounds.   Pulmonary/Chest: Effort normal and breath sounds normal.   Abdominal: Soft. Bowel sounds are normal.   Neurological: She is alert.   Unremarkable gait and station   Skin: Skin is warm and dry.   Nursing note and vitals reviewed.      No results found for: INR    Procedures    Assessment/Plan 1.  ADD continue present Adderall    2.  Hyperlipidemia await pending lab further recommendations follow recheck in 6 months continue Lipitor    3.  Medication monitoring with CBC and CMP recheck in 6 months    Much of this encounter note is an electronic transcription/translation of spoken language to printed text.  The electronic translation of spoken language may permit erroneous, or at times, nonsensical words or phrases to be  inadvertently transcribed.  Although I have reviewed the note for such errors, some may still exist. If there are questions or for further clarification, please contact me.    uds  contract

## 2019-07-30 ENCOUNTER — TELEPHONE (OUTPATIENT)
Dept: FAMILY MEDICINE CLINIC | Facility: CLINIC | Age: 64
End: 2019-07-30

## 2019-07-30 RX ORDER — ACYCLOVIR 400 MG/1
TABLET ORAL
Qty: 90 TABLET | Refills: 3 | Status: SHIPPED | OUTPATIENT
Start: 2019-07-30

## 2019-07-30 NOTE — TELEPHONE ENCOUNTER
PHARMACY NEEDS MORE INFO ABOUT THE acyclovir (ZOVIRAX) 400 MG tablet , PATIENT STATES HER INSURANCE RUNS OUT TOMM SO IT NEEDS TO BE DONE ASAP. SAW BTI YESTERDAY

## 2019-08-01 LAB — CONV REPORT SUMMARY: NORMAL

## 2019-09-03 ENCOUNTER — PREP FOR SURGERY (OUTPATIENT)
Dept: OTHER | Facility: HOSPITAL | Age: 64
End: 2019-09-03

## 2019-09-03 DIAGNOSIS — Z12.11 ENCOUNTER FOR SCREENING FOR MALIGNANT NEOPLASM OF COLON: Primary | ICD-10-CM

## 2019-09-03 RX ORDER — SODIUM CHLORIDE, SODIUM LACTATE, POTASSIUM CHLORIDE, CALCIUM CHLORIDE 600; 310; 30; 20 MG/100ML; MG/100ML; MG/100ML; MG/100ML
30 INJECTION, SOLUTION INTRAVENOUS CONTINUOUS
Status: CANCELLED | OUTPATIENT
Start: 2019-09-03

## 2020-01-30 ENCOUNTER — TRANSCRIBE ORDERS (OUTPATIENT)
Dept: ADMINISTRATIVE | Facility: HOSPITAL | Age: 65
End: 2020-01-30

## 2020-01-30 DIAGNOSIS — M81.0 SENILE OSTEOPOROSIS: Primary | ICD-10-CM

## 2020-02-11 PROBLEM — M81.0 OSTEOPOROSIS: Status: ACTIVE | Noted: 2020-02-11

## 2020-02-14 ENCOUNTER — INFUSION (OUTPATIENT)
Dept: ONCOLOGY | Facility: HOSPITAL | Age: 65
End: 2020-02-14

## 2020-02-26 RX ORDER — ATORVASTATIN CALCIUM 40 MG/1
40 TABLET, FILM COATED ORAL DAILY
Qty: 90 TABLET | Refills: 3 | Status: SHIPPED | OUTPATIENT
Start: 2020-02-26 | End: 2020-08-12 | Stop reason: ALTCHOICE

## 2020-02-26 NOTE — TELEPHONE ENCOUNTER
PATIENT HAD A LIPID PANEL DRAWN AT HER OB-GYN, AT DR. RAMOS.   SHE WANTS TO KNOW IF WE HAVE RECEIVED A COPY OF THEM .     PATIENT WOULD LIKE TO HAVE THE PRESCRIPTION SENT TO HER MAIL ORDER PHARMACY THROUGH [employer].      SHE NEEDS A REFILL ON HER LIPITOR.     PATIENT CALLBACK [phone]

## 2020-06-09 ENCOUNTER — TELEPHONE (OUTPATIENT)
Dept: FAMILY MEDICINE CLINIC | Facility: CLINIC | Age: 65
End: 2020-06-09

## 2020-06-09 NOTE — TELEPHONE ENCOUNTER
PATIENT CALLED TO CHECK WITH DR SAMPSON IF FOR SOME REASON HE KNOWS A THERAPIST AROUND HER AGE (50'S OR 60'S) WHO SHE CAN SEE.   SHE WAS CHECKING ON THE [employer] WEBSITE, AND ALL THE THERAPISTS AVAILABLE WERE TOO YOUNG. SHE WOULD LIKE TO SPEAK TO SOMEONE WHO IS AROUND THE SAME AGE.    PLEASE ADVISE

## 2020-06-11 ENCOUNTER — TELEPHONE (OUTPATIENT)
Dept: FAMILY MEDICINE CLINIC | Facility: CLINIC | Age: 65
End: 2020-06-11

## 2020-06-11 RX ORDER — NITROFURANTOIN 25; 75 MG/1; MG/1
100 CAPSULE ORAL 2 TIMES DAILY
Qty: 14 CAPSULE | Refills: 0 | Status: SHIPPED | OUTPATIENT
Start: 2020-06-11 | End: 2020-08-26

## 2020-06-11 NOTE — TELEPHONE ENCOUNTER
Patient is calling back to check the status of her request for an antibiotic for a UTI.    Please advise.      Patient call back [phone]

## 2020-06-11 NOTE — TELEPHONE ENCOUNTER
Patient calling and states she is having frequent urination and cramping as well as a low grade fever. Would like something called in to help. Verified [facility] on [address].    Patient call back is [phone].

## 2020-07-04 ENCOUNTER — TELEPHONE (OUTPATIENT)
Dept: URGENT CARE | Facility: CLINIC | Age: 65
End: 2020-07-04

## 2020-07-04 NOTE — TELEPHONE ENCOUNTER
Spoke to patient informed her of her negative Covid 19 test results and that she needs to still quarantine for the full 10 days, she stated she understood

## 2020-08-11 ENCOUNTER — TELEPHONE (OUTPATIENT)
Dept: FAMILY MEDICINE CLINIC | Facility: CLINIC | Age: 65
End: 2020-08-11

## 2020-08-11 NOTE — TELEPHONE ENCOUNTER
PT GOT HER RESULTS AND NOW WANTS TO KNOW IF SHE SHOULD CONTINUE TO TAKE HER atorvastatin (LIPITOR) 40 MG tablet  PTS AST 46 AND ALT OF 97      PT CALL BACK  [phone]

## 2020-08-11 NOTE — TELEPHONE ENCOUNTER
Patient informed no results here from OBGYN.  She will have them faxed and make a follow up appt.[email or web address]

## 2020-08-11 NOTE — TELEPHONE ENCOUNTER
Pt informed we have not received results yet  She said they were concerned about ast-46 alt-97  Should she continue lipitor?

## 2020-08-12 NOTE — TELEPHONE ENCOUNTER
ALT is 97 and AST is 46.  Would recommend to stop atorvastatin and recheck in 1 month   Pt informed

## 2020-08-13 ENCOUNTER — TELEPHONE (OUTPATIENT)
Dept: FAMILY MEDICINE CLINIC | Facility: CLINIC | Age: 65
End: 2020-08-13

## 2020-08-13 NOTE — TELEPHONE ENCOUNTER
PATIENT HAS PROLIA INJECTION SCHEDULED 8/18 AND IS CONCERNED IF IT IS STILL OK FOR HER TO GET DUE TO ABNORMAL LABS    PATIENT ALSO HAS APPOINTMENT ON 8/26 AND WANTS TO KNOW IF CAN SCHEDULE LABS PRIOR TO APPOINTMENT SO THAT RESULTS COULD BE DISCUSSED AT APPOINTMENT.    PATIENT CALL BACK NUMBER:  [phone]

## 2020-08-18 ENCOUNTER — APPOINTMENT (OUTPATIENT)
Dept: ONCOLOGY | Facility: HOSPITAL | Age: 65
End: 2020-08-18

## 2020-08-24 ENCOUNTER — LAB (OUTPATIENT)
Dept: FAMILY MEDICINE CLINIC | Facility: CLINIC | Age: 65
End: 2020-08-24

## 2020-08-24 DIAGNOSIS — E78.5 DYSLIPIDEMIA: ICD-10-CM

## 2020-08-24 DIAGNOSIS — E78.5 DYSLIPIDEMIA: Primary | ICD-10-CM

## 2020-08-24 LAB
ALBUMIN SERPL-MCNC: 4.5 G/DL (ref 3.5–5.2)
ALBUMIN/GLOB SERPL: 2.3 G/DL
ALP SERPL-CCNC: 40 U/L (ref 39–117)
ALT SERPL W P-5'-P-CCNC: 37 U/L (ref 1–33)
ANION GAP SERPL CALCULATED.3IONS-SCNC: 11 MMOL/L (ref 5–15)
AST SERPL-CCNC: 23 U/L (ref 1–32)
BILIRUB SERPL-MCNC: 0.5 MG/DL (ref 0–1.2)
BUN SERPL-MCNC: 14 MG/DL (ref 8–23)
BUN/CREAT SERPL: 18.2 (ref 7–25)
CALCIUM SPEC-SCNC: 8.8 MG/DL (ref 8.6–10.5)
CHLORIDE SERPL-SCNC: 104 MMOL/L (ref 98–107)
CHOLEST SERPL-MCNC: 215 MG/DL (ref 0–200)
CO2 SERPL-SCNC: 25 MMOL/L (ref 22–29)
CREAT SERPL-MCNC: 0.77 MG/DL (ref 0.57–1)
GFR SERPL CREATININE-BSD FRML MDRD: 75 ML/MIN/1.73
GLOBULIN UR ELPH-MCNC: 2 GM/DL
GLUCOSE SERPL-MCNC: 94 MG/DL (ref 65–99)
HDLC SERPL-MCNC: 79 MG/DL (ref 40–60)
LDLC SERPL CALC-MCNC: 121 MG/DL (ref 0–100)
LDLC/HDLC SERPL: 1.53 {RATIO}
POTASSIUM SERPL-SCNC: 4.2 MMOL/L (ref 3.5–5.2)
PROT SERPL-MCNC: 6.5 G/DL (ref 6–8.5)
SODIUM SERPL-SCNC: 140 MMOL/L (ref 136–145)
TRIGL SERPL-MCNC: 74 MG/DL (ref 0–150)
VLDLC SERPL-MCNC: 14.8 MG/DL (ref 5–40)

## 2020-08-24 PROCEDURE — 80053 COMPREHEN METABOLIC PANEL: CPT | Performed by: NURSE PRACTITIONER

## 2020-08-24 PROCEDURE — 36415 COLL VENOUS BLD VENIPUNCTURE: CPT | Performed by: NURSE PRACTITIONER

## 2020-08-24 PROCEDURE — 80061 LIPID PANEL: CPT | Performed by: NURSE PRACTITIONER

## 2020-08-25 DIAGNOSIS — R73.9 ELEVATED BLOOD SUGAR: Primary | ICD-10-CM

## 2020-08-26 ENCOUNTER — OFFICE VISIT (OUTPATIENT)
Dept: FAMILY MEDICINE CLINIC | Facility: CLINIC | Age: 65
End: 2020-08-26

## 2020-08-26 VITALS
HEART RATE: 66 BPM | HEIGHT: 68 IN | BODY MASS INDEX: 23.49 KG/M2 | DIASTOLIC BLOOD PRESSURE: 62 MMHG | OXYGEN SATURATION: 100 % | WEIGHT: 155 LBS | TEMPERATURE: 98 F | SYSTOLIC BLOOD PRESSURE: 104 MMHG

## 2020-08-26 DIAGNOSIS — R79.89 ELEVATED LFTS: ICD-10-CM

## 2020-08-26 DIAGNOSIS — E78.5 DYSLIPIDEMIA: ICD-10-CM

## 2020-08-26 DIAGNOSIS — Z00.00 MEDICARE ANNUAL WELLNESS VISIT, SUBSEQUENT: Primary | ICD-10-CM

## 2020-08-26 DIAGNOSIS — F41.9 ANXIETY: ICD-10-CM

## 2020-08-26 PROCEDURE — 96160 PT-FOCUSED HLTH RISK ASSMT: CPT | Performed by: INTERNAL MEDICINE

## 2020-08-26 PROCEDURE — G0439 PPPS, SUBSEQ VISIT: HCPCS | Performed by: INTERNAL MEDICINE

## 2020-08-26 RX ORDER — HYDROXYZINE HYDROCHLORIDE 25 MG/1
25 TABLET, FILM COATED ORAL EVERY 8 HOURS PRN
Qty: 30 TABLET | Refills: 0 | Status: SHIPPED | OUTPATIENT
Start: 2020-08-26 | End: 2022-11-16

## 2020-08-26 RX ORDER — ROSUVASTATIN CALCIUM 10 MG/1
10 TABLET, COATED ORAL DAILY
Qty: 30 TABLET | Refills: 3 | Status: SHIPPED | OUTPATIENT
Start: 2020-08-26 | End: 2020-09-25 | Stop reason: SDUPTHER

## 2020-08-26 NOTE — PATIENT INSTRUCTIONS
Medicare Wellness  Personal Prevention Plan of Service     Date of Office Visit:  2020  Encounter Provider:  José Miguel Vazquez MD  Place of Service:  [facility]  Patient Name: Aurora Fisher  :  1955    As part of the Medicare Wellness portion of your visit today, we are providing you with this personalized preventive plan of services (PPPS). This plan is based upon recommendations of the [place] Preventive Services Task Force (USPSTF) and the Advisory Committee on Immunization Practices (ACIP).    This lists the preventive care services that should be considered, and provides dates of when you are due. Items listed as completed are up-to-date and do not require any further intervention.    Health Maintenance   Topic Date Due   • TDAP/TD VACCINES (1 - Tdap) 1965   • HEPATITIS C SCREENING  2016   • Pneumococcal Vaccine Once at 65 Years Old  2019   • INFLUENZA VACCINE  2020   • DXA SCAN  2021   • MAMMOGRAM  2021   • LIPID PANEL  2021   • MEDICARE ANNUAL WELLNESS  2021   • COLONOSCOPY  2029   • ZOSTER VACCINE  Completed       Orders Placed This Encounter   Procedures   • US Abdomen Complete     Standing Status:   Future     Standing Expiration Date:   2021     Order Specific Question:   Reason for Exam:     Answer:   elevated LFT'S       No follow-ups on file.

## 2020-08-26 NOTE — PROGRESS NOTES
Subsequent Medicare Wellness Visit   The ABC's of the Annual Wellness Visit    Chief Complaint   Patient presents with   • Results     bloodwork       HPI:  Aurora Fisher, -1955, is a 65 y.o. female who presents for a Subsequent Medicare Wellness Visit.  Patient was seen for Medicare wellness exam.  Patient was seen for elevated LFTs.  Patient had LFTs done in her OB/GYN office and she stopped Lipitor and alcohol.  Liver enzymes went from AST of 46 and ALT of 97 to AST of 23 and ALT of 37.  Crestor 10 mg was restarted and LFTs will be checked in 1 month.  Ultrasound of liver was obtained to rule out fatty liver.  With a statin her triglycerides were 74, , .  Without the statin the LDL was 140.  Patient has undergone a divorce and after reviewing old folder outcomes started developing severe panic attacks.  Patient called her sister and was able to calm down.  Patient was given hydroxyzine 25 mg p.o. every 8 as needed anxiety.    Dictated utilizing Dragon dictation. If there are questions or for further clarification, please contact me.  Recent Hospitalizations:  No hospitalization(s) within the last year..    Current Medical Providers:  Patient Care Team:  José Miguel Vazquez MD as PCP - General    Health Habits and Functional and Cognitive Screening and Depression Screening:  Functional & Cognitive Status 2020   Do you have difficulty preparing food and eating? No   Do you have difficulty bathing yourself, getting dressed or grooming yourself? No   Do you have difficulty using the toilet? No   Do you have difficulty moving around from place to place? No   Do you have trouble with steps or getting out of a bed or a chair? No   Current Diet Well Balanced Diet   Dental Exam Up to date   Eye Exam Up to date   Exercise (times per week) 3 times per week   Current Exercise Activities Include Aerobics   Do you need help using the phone?  No   Are you deaf or do you have serious difficulty  hearing?  No   Do you need help with transportation? No   Do you need help shopping? No   Do you need help preparing meals?  No   Do you need help with housework?  No   Do you need help with laundry? No   Do you need help taking your medications? No   Do you need help managing money? No   Do you ever drive or ride in a car without wearing a seat belt? No   Have you felt unusual stress, anger or loneliness in the last month? No   Who do you live with? Alone   If you need help, do you have trouble finding someone available to you? No   Have you been bothered in the last four weeks by sexual problems? No   Do you have difficulty concentrating, remembering or making decisions? No       Compared to one year ago, the patient feels her physical health is the same and her mental health is the same.    Depression Screen:  PHQ-2/PHQ-9 Depression Screening 8/26/2020   Little interest or pleasure in doing things 0   Feeling down, depressed, or hopeless 0   Trouble falling or staying asleep, or sleeping too much 0   Feeling tired or having little energy 0   Poor appetite or overeating 0   Feeling bad about yourself - or that you are a failure or have let yourself or your family down 0   Trouble concentrating on things, such as reading the newspaper or watching television 0   Moving or speaking so slowly that other people could have noticed. Or the opposite - being so fidgety or restless that you have been moving around a lot more than usual 0   Thoughts that you would be better off dead, or of hurting yourself in some way 0   Total Score 0   If you checked off any problems, how difficult have these problems made it for you to do your work, take care of things at home, or get along with other people? Not difficult at all         Past Medical/Family/Social History:  The following portions of the patient's history were reviewed and updated as appropriate: allergies, current medications, past family history, past medical history,  past social history, past surgical history and problem list.    Allergies   Allergen Reactions   • Penicillins Rash   • Sulfa Antibiotics Rash         Current Outpatient Medications:   •  acyclovir (ZOVIRAX) 400 MG tablet, Daily use, Disp: 90 tablet, Rfl: 3  •  ADVAIR DISKUS 250-50 MCG/DOSE DISKUS, Inhale 1 puff 2 (Two) Times a Day., Disp: 180 each, Rfl: 3  •  albuterol sulfate  (90 Base) MCG/ACT inhaler, Inhale 2 puffs Every 6 (Six) Hours As Needed for Wheezing., Disp: 3 inhaler, Rfl: 3  •  amphetamine-dextroamphetamine (ADDERALL) 10 MG tablet, 1 twice a day for ADHD, Disp: 60 tablet, Rfl: 0  •  aspirin 81 MG chewable tablet, Chew 81 mg Daily., Disp: , Rfl:   •  calcium carbonate (OS-KILLIAN) 600 MG tablet, Take 600 mg by mouth Daily., Disp: , Rfl:   •  cholecalciferol (VITAMIN D3) 1000 units tablet, Take 1,000 Units by mouth 2 (Two) Times a Day., Disp: , Rfl:   •  ESTRING 2 MG vaginal ring, , Disp: , Rfl:   •  montelukast (SINGULAIR) 10 MG tablet, Take 1 tablet by mouth Every Night., Disp: 90 tablet, Rfl: 3  •  pramipexole (MIRAPEX) 1 MG tablet, Take 1 tablet by mouth Daily., Disp: 90 tablet, Rfl: 3  •  PROLIA 60 MG/ML solution syringe, One injection every 6 months, Disp: , Rfl:   •  hydrOXYzine (ATARAX) 25 MG tablet, Take 1 tablet by mouth Every 8 (Eight) Hours As Needed for Anxiety (Do not drive)., Disp: 30 tablet, Rfl: 0  •  rosuvastatin (Crestor) 10 MG tablet, Take 1 tablet by mouth Daily., Disp: 30 tablet, Rfl: 3    Aspirin use counseling: Taking ASA but at inappropriate dose (instructed to take NA mg ECASA daily)    Current medication list contains no high risk medications.  No harmful drug interactions have been identified.     History reviewed. No pertinent family history.    Social History     Tobacco Use   • Smoking status: Never Smoker   • Smokeless tobacco: Never Used   Substance Use Topics   • Alcohol use: Yes     Comment: occasional       Past Surgical History:   Procedure Laterality Date   •  "APPENDECTOMY     • COLONOSCOPY      2-3 years    • COLONOSCOPY N/A 5/31/2019    Procedure: COLONOSCOPY into cecum;  Surgeon: Sid Menard MD;  Location: [facility];  Service: Gastroenterology       Patient Active Problem List   Diagnosis   • Osteopenia   • Cataract   • Asthma   • Allergic   • ADHD (attention deficit hyperactivity disorder)   • Prominent abdominal aortic pulsation   • Dyslipidemia   • History of colon polyps   • Osteoporosis       Review of Systems   Constitutional: Negative for fatigue and fever.   HENT: Positive for congestion. Negative for trouble swallowing.    Eyes: Negative for discharge and visual disturbance.   Respiratory: Negative for choking and shortness of breath.    Cardiovascular: Negative for chest pain and palpitations.   Gastrointestinal: Negative for abdominal pain and blood in stool.   Endocrine: Negative.    Genitourinary: Negative for genital sores and hematuria.   Musculoskeletal: Negative for gait problem and joint swelling.   Skin: Negative for color change, pallor, rash and wound.   Allergic/Immunologic: Positive for environmental allergies. Negative for immunocompromised state.   Neurological: Negative for facial asymmetry and speech difficulty.   Psychiatric/Behavioral: Negative for hallucinations and suicidal ideas. The patient is nervous/anxious.        Objective     Vitals:    08/26/20 1635   BP: 104/62   BP Location: Left arm   Patient Position: Sitting   Cuff Size: Adult   Pulse: 66   Temp: 98 °F (36.7 °C)   TempSrc: Infrared   SpO2: 100%   Weight: 70.3 kg (155 lb)   Height: 172.7 cm (67.99\")   PainSc: 0-No pain       Patient's Body mass index is 23.57 kg/m². BMI is within normal parameters. No follow-up required..      No exam data present    The patient has no evidence of cognitve impairment.     Physical Exam   Constitutional: She is oriented to person, place, and time. She appears well-developed and well-nourished.   HENT:   Head: Normocephalic and " atraumatic.   Right Ear: External ear normal.   Left Ear: External ear normal.   Nose: Nose normal.   Mouth/Throat: Oropharynx is clear and moist.   Eyes: Pupils are equal, round, and reactive to light. Conjunctivae and EOM are normal.   Neck: Normal range of motion. Neck supple.   Cardiovascular: Normal rate, regular rhythm, normal heart sounds and intact distal pulses.   Pulmonary/Chest: Effort normal and breath sounds normal.   Abdominal: Soft. Bowel sounds are normal.   Musculoskeletal: Normal range of motion.   Neurological: She is alert and oriented to person, place, and time.   Skin: Skin is warm and dry.   Psychiatric: Her behavior is normal. Judgment and thought content normal.   Moderate anxiety with panic attacks       Recent Lab Results:     Lab Results   Component Value Date    CHOL 215 (H) 08/24/2020    TRIG 74 08/24/2020    HDL 79 (H) 08/24/2020    VLDL 14.8 08/24/2020    LDLHDL 1.53 08/24/2020       Assessment/Plan #1 hydroxyzine 25 mg p.o. every 8.  #2 Crestor 10 mg daily #3 LFTs 1 month #4 cholesterol profile 3 months #5 low cholesterol diet and low impact exercise 30 minutes 3 5 times a week.  Age-appropriate Screening Schedule:  Refer to the list below for future screening recommendations based on patient's age, sex and/or medical conditions.      Health Maintenance   Topic Date Due   • TDAP/TD VACCINES (1 - Tdap) 08/11/1965   • INFLUENZA VACCINE  08/01/2020   • DXA SCAN  03/01/2021   • MAMMOGRAM  07/30/2021   • LIPID PANEL  08/24/2021   • COLONOSCOPY  05/31/2029   • ZOSTER VACCINE  Completed       Medicare Risks and Personalized Health Plan:  NA      CMS-Preventive Services Quick Reference  Medicare Preventive Services Addressed:  NA    Advance Care Planning:  ACP discussion was held with the patient during this visit. Patient has an advance directive in EMR which is still valid.     Diagnoses and all orders for this visit:    1. Medicare annual wellness visit, subsequent (Primary)    2.  Dyslipidemia    3. Elevated LFTs  -     US Abdomen Complete; Future    4. Anxiety    Other orders  -     rosuvastatin (Crestor) 10 MG tablet; Take 1 tablet by mouth Daily.  Dispense: 30 tablet; Refill: 3  -     hydrOXYzine (ATARAX) 25 MG tablet; Take 1 tablet by mouth Every 8 (Eight) Hours As Needed for Anxiety (Do not drive).  Dispense: 30 tablet; Refill: 0        An After Visit Summary and PPPS with all of these plans were given to the patient.      Follow Up:  Return in about 3 months (around 11/26/2020), or if symptoms worsen or fail to improve, for Recheck.

## 2020-09-04 ENCOUNTER — HOSPITAL ENCOUNTER (OUTPATIENT)
Dept: ULTRASOUND IMAGING | Facility: HOSPITAL | Age: 65
Discharge: HOME | End: 2020-09-04
Admitting: INTERNAL MEDICINE

## 2020-09-04 DIAGNOSIS — R79.89 ELEVATED LFTS: ICD-10-CM

## 2020-09-04 PROCEDURE — 76700 US EXAM ABDOM COMPLETE: CPT

## 2020-09-08 DIAGNOSIS — D18.03 HEMANGIOMA OF LIVER: Primary | ICD-10-CM

## 2020-09-22 ENCOUNTER — TELEPHONE (OUTPATIENT)
Dept: FAMILY MEDICINE CLINIC | Facility: CLINIC | Age: 65
End: 2020-09-22

## 2020-09-22 RX ORDER — DOXYCYCLINE HYCLATE 100 MG
100 TABLET ORAL 2 TIMES DAILY
Qty: 20 TABLET | Refills: 0 | Status: SHIPPED | OUTPATIENT
Start: 2020-09-22 | End: 2020-10-21

## 2020-09-23 ENCOUNTER — LAB (OUTPATIENT)
Dept: FAMILY MEDICINE CLINIC | Facility: CLINIC | Age: 65
End: 2020-09-23

## 2020-09-23 LAB
ANION GAP SERPL CALCULATED.3IONS-SCNC: 10.4 MMOL/L (ref 5–15)
BUN SERPL-MCNC: 8 MG/DL (ref 8–23)
BUN/CREAT SERPL: 9.5 (ref 7–25)
CALCIUM SPEC-SCNC: 8.5 MG/DL (ref 8.6–10.5)
CHLORIDE SERPL-SCNC: 105 MMOL/L (ref 98–107)
CO2 SERPL-SCNC: 25.6 MMOL/L (ref 22–29)
CREAT SERPL-MCNC: 0.84 MG/DL (ref 0.57–1)
GFR SERPL CREATININE-BSD FRML MDRD: 68 ML/MIN/1.73
GLUCOSE SERPL-MCNC: 90 MG/DL (ref 65–99)
HBA1C MFR BLD: 5.3 % (ref 4.8–5.6)
POTASSIUM SERPL-SCNC: 4.1 MMOL/L (ref 3.5–5.2)
SODIUM SERPL-SCNC: 141 MMOL/L (ref 136–145)

## 2020-09-23 PROCEDURE — 83036 HEMOGLOBIN GLYCOSYLATED A1C: CPT | Performed by: INTERNAL MEDICINE

## 2020-09-23 PROCEDURE — 80048 BASIC METABOLIC PNL TOTAL CA: CPT | Performed by: INTERNAL MEDICINE

## 2020-09-23 PROCEDURE — 36415 COLL VENOUS BLD VENIPUNCTURE: CPT | Performed by: INTERNAL MEDICINE

## 2020-09-25 ENCOUNTER — TELEPHONE (OUTPATIENT)
Dept: FAMILY MEDICINE CLINIC | Facility: CLINIC | Age: 65
End: 2020-09-25

## 2020-09-25 RX ORDER — ROSUVASTATIN CALCIUM 10 MG/1
10 TABLET, COATED ORAL DAILY
Qty: 90 TABLET | Refills: 1 | Status: SHIPPED | OUTPATIENT
Start: 2020-09-25 | End: 2021-02-18

## 2020-09-25 RX ORDER — ROSUVASTATIN CALCIUM 10 MG/1
10 TABLET, COATED ORAL DAILY
Qty: 30 TABLET | Refills: 3 | Status: SHIPPED | OUTPATIENT
Start: 2020-09-25 | End: 2020-09-25 | Stop reason: SDUPTHER

## 2020-09-25 NOTE — TELEPHONE ENCOUNTER
These were results you already did in august, she was supposed to come back and have them rechecked on 9/24  You ordered bmp  She was supposed to have cmp and lipid

## 2020-09-25 NOTE — TELEPHONE ENCOUNTER
PT CALLED AGAIN TO FOLLOW UP ON HAVING HER RESULTS POSTED ON [employer] SO SHE CAN LOOK AT THE INDIVIDUAL NUMBERS.     SHE REQUESTS THAT WE POST BEFORE WE HEAD INTO THE WEEKEND.    SHE ALSO REQUESTS THE NAME AND CONTACT INFO FOR THE GASTROENTEROLOGIST SHE IS BEING REFERRED TO.    SHE ALSO WANTS DR. SAMPSON TO KNOW SHE GOT HER FLU AND PNEUMONIA SHOTS TODAY.

## 2020-09-25 NOTE — TELEPHONE ENCOUNTER
PATIENT CALLED AND WANTED TO GET THE LAB RESULTS FROM 9/23/20. SHE WANTED HER LIVER ENZYME RESULTS.     ACCORDING TO THE PATIENT UPON RECEIPT OF THE RESULTS SHE MAY NEED ANOTHER PRESCRIPTION FOR HER  rosuvastatin (Crestor) 10 MG tablet    PATIENT CONFIRMED [facility] MAIL ORDER FOR MEDICATION REFILL      PATIENT CONFIRMED CALL BACK NUMBER [phone]

## 2020-09-25 NOTE — TELEPHONE ENCOUNTER
I do not know how to post the labs.  Hemoglobin A1c was 5.8%.  Blood sugar was 94.  LDL was 122.  Rest the labs are normal.  Patient was seen at [facility] see his gastroenterologist but the referral system will determine who you get

## 2020-09-28 ENCOUNTER — TELEPHONE (OUTPATIENT)
Dept: FAMILY MEDICINE CLINIC | Facility: CLINIC | Age: 65
End: 2020-09-28

## 2020-09-28 DIAGNOSIS — R79.89 ELEVATED LFTS: Primary | ICD-10-CM

## 2020-09-28 NOTE — TELEPHONE ENCOUNTER
Patient called-she stated she was to be referred to gastroenterology for hemangioma of liver, and wants to know status of scheduling that appointment    Patient also stated she was supposed to have had a comprehensive metabolic panel and lipid panel on 9/23 and instead got A1C and basic metabolic panel. She would like order for comprehensive met panel and lipid panel but does not want to be charged for the basic met panel and A1C    Patient call back:   [phone]

## 2020-09-28 NOTE — TELEPHONE ENCOUNTER
The hemangioma is hereditary and can not be fixed . The lipid profile was drawn in august and insurance will not pay for three months

## 2020-09-30 NOTE — TELEPHONE ENCOUNTER
PATIENT CALLING BACK REGARDING THE BELOW REQUEST ON LAB ORDER, PATIENT INDICATED SHE HAS CRESTOR ORDERED AND WANTED TO GET THE LAB NEEDED TO CHECK  HER ENZYMES SO IS AWARE IF THE DOSAGE OF CRESTOR IS WORKING OR NOT?  PATIENT STATES SHE NEEDS LAB ORDER FOR CHOLESTEROL AND COMPLETE METABOLIC PANEL SO IT INCLUDES THE ENZYMES THAT WAS ADVISED WOULD BE ORDERED BUT DIDN'T RECEIVE.      ALSO WANTED THE REFERRAL FOR GASTROENTEROLOGIST COMPLETED.    PT CALL BACK # [phone]

## 2020-10-08 NOTE — TELEPHONE ENCOUNTER
PATIENT IS UPSET THAT NO ONE IS CALLING HER BACK!  SHE FEELS THAT SHE SHOULD NOT HAVE TO PAY FOR THESE TESTS AS THE LAB DID NOT DRAW FOR THE CORRECT TESTS.    PLEASE HAVE SOME ONE CALL HER BACK REGARDING THIS!      PATIENT CALLING BACK REGARDING THE BELOW REQUEST ON LAB ORDER, PATIENT INDICATED SHE HAS CRESTOR ORDERED AND WANTED TO GET THE LAB NEEDED TO CHECK  HER ENZYMES SO IS AWARE IF THE DOSAGE OF CRESTOR IS WORKING OR NOT?  PATIENT STATES SHE NEEDS LAB ORDER FOR CHOLESTEROL AND COMPLETE METABOLIC PANEL SO IT INCLUDES THE ENZYMES THAT WAS ADVISED WOULD BE ORDERED BUT DIDN'T RECEIVE.       ALSO WANTED THE REFERRAL FOR GASTROENTEROLOGIST COMPLETED.     PT CALL BACK # [phone]

## 2020-10-09 ENCOUNTER — LAB (OUTPATIENT)
Dept: FAMILY MEDICINE CLINIC | Facility: CLINIC | Age: 65
End: 2020-10-09

## 2020-10-09 ENCOUNTER — TELEPHONE (OUTPATIENT)
Dept: FAMILY MEDICINE CLINIC | Facility: CLINIC | Age: 65
End: 2020-10-09

## 2020-10-09 LAB
ALBUMIN SERPL-MCNC: 4.5 G/DL (ref 3.5–5.2)
ALBUMIN/GLOB SERPL: 2.1 G/DL
ALP SERPL-CCNC: 47 U/L (ref 39–117)
ALT SERPL W P-5'-P-CCNC: 29 U/L (ref 1–33)
ANION GAP SERPL CALCULATED.3IONS-SCNC: 9.1 MMOL/L (ref 5–15)
AST SERPL-CCNC: 24 U/L (ref 1–32)
BILIRUB SERPL-MCNC: 0.4 MG/DL (ref 0–1.2)
BUN SERPL-MCNC: 14 MG/DL (ref 8–23)
BUN/CREAT SERPL: 17.5 (ref 7–25)
CALCIUM SPEC-SCNC: 9.1 MG/DL (ref 8.6–10.5)
CHLORIDE SERPL-SCNC: 107 MMOL/L (ref 98–107)
CO2 SERPL-SCNC: 27.9 MMOL/L (ref 22–29)
CREAT SERPL-MCNC: 0.8 MG/DL (ref 0.57–1)
GFR SERPL CREATININE-BSD FRML MDRD: 72 ML/MIN/1.73
GLOBULIN UR ELPH-MCNC: 2.1 GM/DL
GLUCOSE SERPL-MCNC: 94 MG/DL (ref 65–99)
POTASSIUM SERPL-SCNC: 4.3 MMOL/L (ref 3.5–5.2)
PROT SERPL-MCNC: 6.6 G/DL (ref 6–8.5)
SODIUM SERPL-SCNC: 144 MMOL/L (ref 136–145)

## 2020-10-09 PROCEDURE — 36415 COLL VENOUS BLD VENIPUNCTURE: CPT | Performed by: INTERNAL MEDICINE

## 2020-10-09 PROCEDURE — 80053 COMPREHEN METABOLIC PANEL: CPT | Performed by: INTERNAL MEDICINE

## 2020-10-12 DIAGNOSIS — E78.5 DYSLIPIDEMIA: ICD-10-CM

## 2020-10-12 DIAGNOSIS — R79.89 ELEVATED LFTS: Primary | ICD-10-CM

## 2020-10-13 NOTE — TELEPHONE ENCOUNTER
They should have received records by now, so if they haven't contacted her yet, she can call [phone] to get her appt scheduled.

## 2020-10-21 ENCOUNTER — OFFICE VISIT (OUTPATIENT)
Dept: FAMILY MEDICINE CLINIC | Facility: CLINIC | Age: 65
End: 2020-10-21

## 2020-10-21 VITALS
TEMPERATURE: 97.3 F | BODY MASS INDEX: 23.04 KG/M2 | RESPIRATION RATE: 18 BRPM | HEIGHT: 68 IN | WEIGHT: 152 LBS | SYSTOLIC BLOOD PRESSURE: 108 MMHG | OXYGEN SATURATION: 98 % | DIASTOLIC BLOOD PRESSURE: 68 MMHG | HEART RATE: 76 BPM

## 2020-10-21 DIAGNOSIS — E78.5 DYSLIPIDEMIA: ICD-10-CM

## 2020-10-21 DIAGNOSIS — E55.9 VITAMIN D DEFICIENCY, UNSPECIFIED: ICD-10-CM

## 2020-10-21 DIAGNOSIS — R10.30 LOWER ABDOMINAL PAIN: Primary | ICD-10-CM

## 2020-10-21 DIAGNOSIS — J45.20 MILD INTERMITTENT ASTHMA WITHOUT COMPLICATION: ICD-10-CM

## 2020-10-21 PROBLEM — K57.90 DIVERTICULOSIS: Status: ACTIVE | Noted: 2020-10-21

## 2020-10-21 LAB
25(OH)D3 SERPL-MCNC: 63 NG/ML (ref 30–100)
ALBUMIN SERPL-MCNC: 4.8 G/DL (ref 3.5–5.2)
ALBUMIN/GLOB SERPL: 2.3 G/DL
ALP SERPL-CCNC: 54 U/L (ref 39–117)
ALT SERPL W P-5'-P-CCNC: 21 U/L (ref 1–33)
AMYLASE SERPL-CCNC: 57 U/L (ref 28–100)
ANION GAP SERPL CALCULATED.3IONS-SCNC: 9.9 MMOL/L (ref 5–15)
AST SERPL-CCNC: 19 U/L (ref 1–32)
BACTERIA UR QL AUTO: NORMAL /HPF
BILIRUB SERPL-MCNC: 0.5 MG/DL (ref 0–1.2)
BILIRUB UR QL STRIP: NEGATIVE
BUN SERPL-MCNC: 12 MG/DL (ref 8–23)
BUN/CREAT SERPL: 13.5 (ref 7–25)
CALCIUM SPEC-SCNC: 9.8 MG/DL (ref 8.6–10.5)
CHLORIDE SERPL-SCNC: 102 MMOL/L (ref 98–107)
CLARITY UR: CLEAR
CO2 SERPL-SCNC: 28.1 MMOL/L (ref 22–29)
COLOR UR: YELLOW
CREAT SERPL-MCNC: 0.89 MG/DL (ref 0.57–1)
ERYTHROCYTE [DISTWIDTH] IN BLOOD BY AUTOMATED COUNT: 13.1 % (ref 12.3–15.4)
GFR SERPL CREATININE-BSD FRML MDRD: 63 ML/MIN/1.73
GLOBULIN UR ELPH-MCNC: 2.1 GM/DL
GLUCOSE SERPL-MCNC: 95 MG/DL (ref 65–99)
GLUCOSE UR STRIP-MCNC: NEGATIVE MG/DL
HCT VFR BLD AUTO: 44.8 % (ref 34–46.6)
HGB BLD-MCNC: 15.3 G/DL (ref 12–15.9)
HGB UR QL STRIP.AUTO: NEGATIVE
KETONES UR QL STRIP: NEGATIVE
LEUKOCYTE ESTERASE UR QL STRIP.AUTO: NEGATIVE
LYMPHOCYTES # BLD AUTO: 1.4 10*3/MM3 (ref 0.7–3.1)
LYMPHOCYTES NFR BLD AUTO: 26 % (ref 19.6–45.3)
MCH RBC QN AUTO: 29.9 PG (ref 26.6–33)
MCHC RBC AUTO-ENTMCNC: 34.2 G/DL (ref 31.5–35.7)
MCV RBC AUTO: 87.6 FL (ref 79–97)
MONOCYTES # BLD AUTO: 0.6 10*3/MM3 (ref 0.1–0.9)
MONOCYTES NFR BLD AUTO: 11.7 % (ref 5–12)
NEUTROPHILS NFR BLD AUTO: 3.3 10*3/MM3 (ref 1.7–7)
NEUTROPHILS NFR BLD AUTO: 62.3 % (ref 42.7–76)
NITRITE UR QL STRIP: NEGATIVE
PH UR STRIP.AUTO: 5.5 [PH] (ref 4.6–8)
PLATELET # BLD AUTO: 306 10*3/MM3 (ref 140–450)
PMV BLD AUTO: 7.8 FL (ref 6–12)
POTASSIUM SERPL-SCNC: 4.3 MMOL/L (ref 3.5–5.2)
PROT SERPL-MCNC: 6.9 G/DL (ref 6–8.5)
PROT UR QL STRIP: NEGATIVE
RBC # BLD AUTO: 5.11 10*6/MM3 (ref 3.77–5.28)
RBC # UR: NORMAL /HPF
REF LAB TEST METHOD: NORMAL
SODIUM SERPL-SCNC: 140 MMOL/L (ref 136–145)
SP GR UR STRIP: 1.01 (ref 1–1.03)
SQUAMOUS #/AREA URNS HPF: NORMAL /HPF
UROBILINOGEN UR QL STRIP: NORMAL
WBC # BLD AUTO: 5.3 10*3/MM3 (ref 3.4–10.8)
WBC UR QL AUTO: NORMAL /HPF

## 2020-10-21 PROCEDURE — 36415 COLL VENOUS BLD VENIPUNCTURE: CPT | Performed by: INTERNAL MEDICINE

## 2020-10-21 PROCEDURE — 85025 COMPLETE CBC W/AUTO DIFF WBC: CPT | Performed by: INTERNAL MEDICINE

## 2020-10-21 PROCEDURE — 99214 OFFICE O/P EST MOD 30 MIN: CPT | Performed by: INTERNAL MEDICINE

## 2020-10-21 PROCEDURE — 87086 URINE CULTURE/COLONY COUNT: CPT | Performed by: INTERNAL MEDICINE

## 2020-10-21 PROCEDURE — 82306 VITAMIN D 25 HYDROXY: CPT | Performed by: INTERNAL MEDICINE

## 2020-10-21 PROCEDURE — 80053 COMPREHEN METABOLIC PANEL: CPT | Performed by: INTERNAL MEDICINE

## 2020-10-21 PROCEDURE — 82150 ASSAY OF AMYLASE: CPT | Performed by: INTERNAL MEDICINE

## 2020-10-21 PROCEDURE — 81001 URINALYSIS AUTO W/SCOPE: CPT | Performed by: INTERNAL MEDICINE

## 2020-10-21 RX ORDER — HYOSCYAMINE SULFATE 0.12 MG/1
0.12 TABLET SUBLINGUAL 4 TIMES DAILY PRN
Qty: 120 EACH | Refills: 1 | Status: SHIPPED | OUTPATIENT
Start: 2020-10-21

## 2020-10-21 NOTE — PROGRESS NOTES
Subjective   Aurora Fisher is a 65 y.o. female.     Vitals:    10/21/20 0912   BP: 108/68   Pulse: 76   Resp: 18   Temp: 97.3 °F (36.3 °C)   SpO2: 98%      Body mass index is 23.12 kg/m².     History of Present Illness   Patient was seen for low back pain.  Patient's low back pain was relieved with antibiotics.  Patient's urinalysis did not show a UTI.  Patient's lower extremity exam was normal with no pain to flexion-extension of the lower extremities.  Patient's lipids are being treated with diet exercise and a statin.  Triglycerides 74, HDL 79, .  Patient has intermittent asthma that is being treated successfully with bronchodilators.  Patient does have vitamin D3 deficiency supplement with over-the-counter D3.    Dictated utilizing Dragon dictation. If there are questions or for further clarification, please contact me.  The following portions of the patient's history were reviewed and updated as appropriate: allergies, current medications, past family history, past medical history, past social history, past surgical history and problem list.    Review of Systems   Constitutional: Negative for fatigue and fever.   HENT: Positive for congestion. Negative for trouble swallowing.    Eyes: Negative for discharge and visual disturbance.   Respiratory: Negative for choking and shortness of breath.    Cardiovascular: Negative for chest pain and palpitations.   Gastrointestinal: Negative for abdominal pain and blood in stool.   Endocrine: Negative.    Genitourinary: Negative for genital sores and hematuria.   Musculoskeletal: Positive for back pain. Negative for gait problem and joint swelling.   Skin: Negative for color change, pallor, rash and wound.   Allergic/Immunologic: Positive for environmental allergies. Negative for immunocompromised state.   Neurological: Negative for facial asymmetry and speech difficulty.   Psychiatric/Behavioral: Negative for hallucinations and suicidal ideas.       Objective    Physical Exam  Vitals signs and nursing note reviewed.   Constitutional:       Appearance: Normal appearance. She is well-developed.   HENT:      Head: Normocephalic and atraumatic.      Nose: Nose normal.      Mouth/Throat:      Mouth: Mucous membranes are moist.      Pharynx: Oropharynx is clear.   Eyes:      Extraocular Movements: Extraocular movements intact.      Conjunctiva/sclera: Conjunctivae normal.      Pupils: Pupils are equal, round, and reactive to light.   Neck:      Musculoskeletal: Normal range of motion and neck supple.   Cardiovascular:      Rate and Rhythm: Normal rate and regular rhythm.      Heart sounds: Normal heart sounds. No murmur. No friction rub. No gallop.    Pulmonary:      Effort: Pulmonary effort is normal. No respiratory distress.      Breath sounds: Normal breath sounds. No stridor. No wheezing or rales.   Chest:      Chest wall: No tenderness.   Abdominal:      General: Bowel sounds are normal.      Palpations: Abdomen is soft.   Musculoskeletal: Normal range of motion.         General: No swelling, tenderness, deformity or signs of injury.      Right lower leg: No edema.      Left lower leg: No edema.   Skin:     General: Skin is warm and dry.   Neurological:      General: No focal deficit present.      Mental Status: She is alert and oriented to person, place, and time. Mental status is at baseline.   Psychiatric:         Mood and Affect: Mood normal.         Behavior: Behavior normal.         Thought Content: Thought content normal.         Judgment: Judgment normal.         Assessment/Plan #1 UA CNS #2 continue present bronchodilator treatment #3 continue Crestor with diet and exercise  Problems Addressed this Visit     Respiratory              Asthma    Relevant Medications    Fluticasone Furoate-Vilanterol (BREO ELLIPTA IN)          Other              Dyslipidemia            Other Visit Diagnoses     Lower abdominal pain    -  Primary    Relevant Orders    CBC &  Differential (Completed)    Vitamin D 25 Hydroxy (Completed)    Amylase (Completed)    Urinalysis With Microscopic - Urine, Clean Catch (Completed)    Urine Culture - Urine, Urine, Clean Catch    CBC Auto Differential (Completed)    Urinalysis without microscopic (no culture) - Urine, Clean Catch (Completed)    Urinalysis, Microscopic Only - Urine, Clean Catch (Completed)    Vitamin D deficiency, unspecified         Relevant Orders    Vitamin D 25 Hydroxy (Completed)      Diagnoses     Diagnosis Codes Comments    Lower abdominal pain    -  Primary ICD-10-CM: R10.30  ICD-9-CM: 789.09     Vitamin D deficiency, unspecified      ICD-10-CM: E55.9  ICD-9-CM: 268.9     Dyslipidemia     ICD-10-CM: E78.5  ICD-9-CM: 272.4     Mild intermittent asthma without complication     ICD-10-CM: J45.20  ICD-9-CM: 493.90

## 2020-10-22 LAB — BACTERIA SPEC AEROBE CULT: NO GROWTH

## 2020-10-23 ENCOUNTER — TELEPHONE (OUTPATIENT)
Dept: FAMILY MEDICINE CLINIC | Facility: CLINIC | Age: 65
End: 2020-10-23

## 2020-10-23 NOTE — TELEPHONE ENCOUNTER
Patient is calling to request the lab results that were done 10/21/20.    Please advise.    Patient call back [phone]

## 2020-11-20 ENCOUNTER — TELEPHONE (OUTPATIENT)
Dept: FAMILY MEDICINE CLINIC | Facility: CLINIC | Age: 65
End: 2020-11-20

## 2020-11-20 RX ORDER — CELECOXIB 200 MG/1
200 CAPSULE ORAL DAILY
Qty: 90 CAPSULE | Refills: 3 | Status: SHIPPED | OUTPATIENT
Start: 2020-11-20 | End: 2022-11-16

## 2020-11-20 NOTE — TELEPHONE ENCOUNTER
Caller: Aurora Fisher    Relationship: Self    Best call back number: [phone]    What medication are you requesting: CELEBREX    What are your current symptoms: PAIN IN HAND, THUMB    How long have you been experiencing symptoms: PATIENT STATED SHE HAD TALKED TO DR. SAMPSON ABOUT THIS PREVIOUSLY    Have you had these symptoms before:    [x] Yes  [] No    Have you been treated for these symptoms before:   [] Yes  [x] No    If a prescription is needed, what is your preferred pharmacy and phone number: [facility] - [facility] [address] - [phone]  - [phone] FX

## 2020-11-25 DIAGNOSIS — E78.5 DYSLIPIDEMIA: Primary | ICD-10-CM

## 2020-11-25 DIAGNOSIS — R79.89 ELEVATED LFTS: ICD-10-CM

## 2020-11-30 ENCOUNTER — LAB (OUTPATIENT)
Dept: FAMILY MEDICINE CLINIC | Facility: CLINIC | Age: 65
End: 2020-11-30

## 2020-11-30 LAB
ALBUMIN SERPL-MCNC: 4.5 G/DL (ref 3.5–5.2)
ALBUMIN/GLOB SERPL: 2 G/DL
ALP SERPL-CCNC: 41 U/L (ref 39–117)
ALT SERPL W P-5'-P-CCNC: 24 U/L (ref 1–33)
ANION GAP SERPL CALCULATED.3IONS-SCNC: 8.7 MMOL/L (ref 5–15)
AST SERPL-CCNC: 22 U/L (ref 1–32)
BILIRUB SERPL-MCNC: 0.6 MG/DL (ref 0–1.2)
BUN SERPL-MCNC: 14 MG/DL (ref 8–23)
BUN/CREAT SERPL: 18.7 (ref 7–25)
CALCIUM SPEC-SCNC: 8.8 MG/DL (ref 8.6–10.5)
CHLORIDE SERPL-SCNC: 107 MMOL/L (ref 98–107)
CHOLEST SERPL-MCNC: 159 MG/DL (ref 0–200)
CO2 SERPL-SCNC: 25.3 MMOL/L (ref 22–29)
CREAT SERPL-MCNC: 0.75 MG/DL (ref 0.57–1)
GFR SERPL CREATININE-BSD FRML MDRD: 77 ML/MIN/1.73
GLOBULIN UR ELPH-MCNC: 2.2 GM/DL
GLUCOSE SERPL-MCNC: 101 MG/DL (ref 65–99)
HDLC SERPL-MCNC: 85 MG/DL (ref 40–60)
LDLC SERPL CALC-MCNC: 63 MG/DL (ref 0–100)
LDLC/HDLC SERPL: 0.76 {RATIO}
POTASSIUM SERPL-SCNC: 4.1 MMOL/L (ref 3.5–5.2)
PROT SERPL-MCNC: 6.7 G/DL (ref 6–8.5)
SODIUM SERPL-SCNC: 141 MMOL/L (ref 136–145)
TRIGL SERPL-MCNC: 49 MG/DL (ref 0–150)
VLDLC SERPL-MCNC: 11 MG/DL (ref 5–40)

## 2020-11-30 PROCEDURE — 36415 COLL VENOUS BLD VENIPUNCTURE: CPT | Performed by: INTERNAL MEDICINE

## 2020-11-30 PROCEDURE — 80061 LIPID PANEL: CPT | Performed by: INTERNAL MEDICINE

## 2020-11-30 PROCEDURE — 80053 COMPREHEN METABOLIC PANEL: CPT | Performed by: INTERNAL MEDICINE

## 2020-12-28 ENCOUNTER — TELEPHONE (OUTPATIENT)
Dept: GASTROENTEROLOGY | Facility: CLINIC | Age: 65
End: 2020-12-28

## 2020-12-28 NOTE — TELEPHONE ENCOUNTER
Returned phone call to patient, no answer. Left message(VM identified patient). Advised Dr. Fortune would like to see her in person for the first visit. Advised she may reschedule her appointment if she would like. Advised to call back if she needs to reschedule.

## 2020-12-28 NOTE — TELEPHONE ENCOUNTER
----- Message from Lori Nasim sent at 12/28/2020  9:27 AM EST -----  Regarding: pt concern/question  Pt is calling because she has a fu office appt with Dr Fortune on 12-31 at 1000 am and she wants to know if we have access to a copy of the 2010 or so CT scan to do a comparison on the size of her liver hepatic hemangioma I did not see one she believes it was done at [facility] but maybe [facility].  She also wants to know if she has to come into the office or if she can have a telephone visit she is concerned about covid due to her age and risk factors.  Please call pt  and or let me know how we need to proceed.  Pt phone [phone]

## 2020-12-28 NOTE — TELEPHONE ENCOUNTER
Returned patient's phone call. She is requesting a phone call visit as opposed to an in office visit due to covid. Advised will need to ask Dr. Fortune.   She also states she has had a CT scan in the past for a liver hemangioma.

## 2021-02-17 ENCOUNTER — TELEPHONE (OUTPATIENT)
Dept: FAMILY MEDICINE CLINIC | Facility: CLINIC | Age: 66
End: 2021-02-17

## 2021-02-17 NOTE — TELEPHONE ENCOUNTER
PATIENT IS DUE TO HAVE SOME BLOOD WORK DONE FOR HER GYN. SHE WOULD LIKE TO KNOW IF DR. SAMPSON NEEDS ANY LABS DONE FROM HER SO THAT SHE COULD POSSIBLY SCHEDULE THE TIMES NEAR EACH OTHER SO THAT SHE DOESN'T HAVE TO COME OUT MORE THAN NEEDED.    PLEASE ADVISE  [phone]

## 2021-02-18 ENCOUNTER — TELEPHONE (OUTPATIENT)
Dept: FAMILY MEDICINE CLINIC | Facility: CLINIC | Age: 66
End: 2021-02-18

## 2021-02-18 DIAGNOSIS — E78.5 DYSLIPIDEMIA: ICD-10-CM

## 2021-02-18 DIAGNOSIS — R79.89 ELEVATED LFTS: Primary | ICD-10-CM

## 2021-02-18 DIAGNOSIS — E55.9 VITAMIN D DEFICIENCY, UNSPECIFIED: ICD-10-CM

## 2021-02-18 RX ORDER — ROSUVASTATIN CALCIUM 10 MG/1
TABLET, COATED ORAL
Qty: 90 TABLET | Refills: 1 | Status: SHIPPED | OUTPATIENT
Start: 2021-02-18 | End: 2021-08-04 | Stop reason: SDUPTHER

## 2021-02-18 NOTE — TELEPHONE ENCOUNTER
Caller: Aurora Fisher    Relationship to patient: Self    Best call back number: [phone]    Patient is needing: patient called stating that labs to be drawn at GYN are to check calcium levels only for Prolia    Patient is asking if Dr Vazquez wants labs drawn. If so, she would like to get on the same 6 month schedule as her GYN.   Lab is scheduled for 2-22-21 at GYN.    Please call patient and advise

## 2021-02-24 ENCOUNTER — TELEPHONE (OUTPATIENT)
Dept: FAMILY MEDICINE CLINIC | Facility: CLINIC | Age: 66
End: 2021-02-24

## 2021-02-24 NOTE — TELEPHONE ENCOUNTER
Patient is requesting that the office request her lab results from Dr. Garner @ [facility].     Patient then wants the results uploaded into [employer].

## 2021-03-15 ENCOUNTER — TELEPHONE (OUTPATIENT)
Dept: FAMILY MEDICINE CLINIC | Facility: CLINIC | Age: 66
End: 2021-03-15

## 2021-03-15 NOTE — TELEPHONE ENCOUNTER
Patient called and would like a call back to go over her labs from [facility]. Please call [phone]. Said its been a couple weeks now.

## 2021-03-16 ENCOUNTER — BULK ORDERING (OUTPATIENT)
Dept: CASE MANAGEMENT | Facility: OTHER | Age: 66
End: 2021-03-16

## 2021-03-16 DIAGNOSIS — Z23 IMMUNIZATION DUE: ICD-10-CM

## 2021-04-06 ENCOUNTER — OFFICE VISIT (OUTPATIENT)
Dept: GASTROENTEROLOGY | Facility: CLINIC | Age: 66
End: 2021-04-06

## 2021-04-06 VITALS — BODY MASS INDEX: 22.85 KG/M2 | HEIGHT: 68 IN | WEIGHT: 150.8 LBS | TEMPERATURE: 96.8 F

## 2021-04-06 DIAGNOSIS — R10.30 LOWER ABDOMINAL PAIN: Primary | ICD-10-CM

## 2021-04-06 PROCEDURE — 99214 OFFICE O/P EST MOD 30 MIN: CPT | Performed by: INTERNAL MEDICINE

## 2021-04-06 RX ORDER — LORATADINE 10 MG/1
10 TABLET ORAL DAILY
COMMUNITY

## 2021-04-06 NOTE — PROGRESS NOTES
Chief Complaint   Patient presents with   • Diverticulosis   • FHx Colon Cancer       Aurora Fisher is a  65 y.o. female here for a follow up visit for lower abdominal pain.    HPI     Patient 65-year-old female with history ADHD, colon polyps and diverticulosis.  Patient seen by Dr. Menard and followed for some time.  Patient noted with a hemangioma in the liver on CAT scan and ultrasound.  Patient did undergo colonoscopy with diverticulosis and colon polyps in the past the last colon in 2019 reportedly negative.  Patient reports last 6 months had several episodes of lower abdominal pain initially treated as UTI but urine apparently came back negative when antibiotics did not seem to help.  Patient self diagnosed diverticular disease as a cause and so when the pain recurred she just stopped eating.  Patient reports the last pain lasted about 3 or 4 days and then resolved with no associated fever chills no bright red blood per rectum or melena.  Patient reports that the pain may have gotten some better with a bowel movement but felt like she was incompletely emptying when she did go to the bathroom.  Patient now asymptomatic and has not had an attack in over 3 months.    Past Medical History:   Diagnosis Date   • ADHD (attention deficit hyperactivity disorder)    • Allergic    • Asthma    • Cataract    • Osteopenia          Current Outpatient Medications:   •  acyclovir (ZOVIRAX) 400 MG tablet, Daily use (Patient taking differently: As Needed.), Disp: 90 tablet, Rfl: 3  •  ADVAIR DISKUS 250-50 MCG/DOSE DISKUS, Inhale 1 puff 2 (Two) Times a Day. (Patient taking differently: Inhale 1 puff As Needed.), Disp: 180 each, Rfl: 3  •  albuterol sulfate  (90 Base) MCG/ACT inhaler, Inhale 2 puffs Every 6 (Six) Hours As Needed for Wheezing., Disp: 3 inhaler, Rfl: 3  •  amphetamine-dextroamphetamine (ADDERALL) 10 MG tablet, 1 twice a day for ADHD (Patient taking differently: As Needed. 1 twice a day for ADHD),  Disp: 60 tablet, Rfl: 0  •  aspirin 81 MG chewable tablet, Chew 81 mg Daily., Disp: , Rfl:   •  calcium carbonate (OS-KILLIAN) 600 MG tablet, Take 600 mg by mouth Daily., Disp: , Rfl:   •  celecoxib (CeleBREX) 200 MG capsule, Take 1 capsule by mouth Daily. (Patient taking differently: Take 200 mg by mouth As Needed.), Disp: 90 capsule, Rfl: 3  •  cholecalciferol (VITAMIN D3) 1000 units tablet, Take 1,000 Units by mouth 2 (Two) Times a Day., Disp: , Rfl:   •  ESTRING 2 MG vaginal ring, As Needed., Disp: , Rfl:   •  Fluticasone Furoate-Vilanterol (BREO ELLIPTA IN), Inhale. Likes better than Advair, Disp: , Rfl:   •  hydrOXYzine (ATARAX) 25 MG tablet, Take 1 tablet by mouth Every 8 (Eight) Hours As Needed for Anxiety (Do not drive)., Disp: 30 tablet, Rfl: 0  •  Hyoscyamine Sulfate SL (Levsin/SL) 0.125 MG sublingual tablet, Place 0.125 mg under the tongue 4 (Four) Times a Day As Needed (cramps)., Disp: 120 each, Rfl: 1  •  loratadine (Claritin) 10 MG tablet, Take 10 mg by mouth Daily., Disp: , Rfl:   •  montelukast (SINGULAIR) 10 MG tablet, Take 1 tablet by mouth Every Night. (Patient taking differently: Take 10 mg by mouth As Needed.), Disp: 90 tablet, Rfl: 3  •  pramipexole (MIRAPEX) 1 MG tablet, Take 1 tablet by mouth Daily. (Patient taking differently: Take 1 mg by mouth As Needed.), Disp: 90 tablet, Rfl: 3  •  PROLIA 60 MG/ML solution syringe, One injection every 6 months, Disp: , Rfl:   •  rosuvastatin (CRESTOR) 10 MG tablet, TAKE 1 TABLET EVERY DAY (Patient taking differently: Every Night.), Disp: 90 tablet, Rfl: 1    Allergies   Allergen Reactions   • Penicillins Rash   • Sulfa Antibiotics Rash       Social History     Socioeconomic History   • Marital status:      Spouse name: Not on file   • Number of children: Not on file   • Years of education: Not on file   • Highest education level: Not on file   Tobacco Use   • Smoking status: Never Smoker   • Smokeless tobacco: Never Used   Substance and Sexual  Activity   • Alcohol use: Yes     Comment: occasional   • Drug use: No       Family History   Problem Relation Age of Onset   • Colon polyps Mother    • Colon polyps Sister    • Colon cancer Maternal Grandfather        Review of Systems   Constitutional: Negative.    HENT: Negative.    Eyes: Negative.    Respiratory: Negative.    Cardiovascular: Negative.    Gastrointestinal: Negative.    Endocrine: Negative.    Musculoskeletal: Negative.    Skin: Negative.    Allergic/Immunologic: Negative.    Hematological: Negative.        Vitals:    04/06/21 1414   Temp: 96.8 °F (36 °C)       Physical Exam  Vitals and nursing note reviewed.   Constitutional:       Appearance: Normal appearance. She is well-developed and normal weight.   HENT:      Head: Normocephalic and atraumatic.   Eyes:      General: No scleral icterus.     Pupils: Pupils are equal, round, and reactive to light.   Cardiovascular:      Rate and Rhythm: Normal rate and regular rhythm.      Heart sounds: Normal heart sounds. No murmur heard.   No friction rub. No gallop.    Pulmonary:      Effort: Pulmonary effort is normal.      Breath sounds: Normal breath sounds. No wheezing or rales.   Abdominal:      General: Bowel sounds are normal. There is no distension or abdominal bruit.      Palpations: Abdomen is soft. Abdomen is not rigid. There is no shifting dullness, fluid wave, mass or pulsatile mass.      Tenderness: There is no abdominal tenderness. There is no guarding.      Hernia: No hernia is present.   Musculoskeletal:         General: No swelling or tenderness. Normal range of motion.   Skin:     General: Skin is warm and dry.      Coloration: Skin is not jaundiced.   Neurological:      General: No focal deficit present.      Mental Status: She is alert and oriented to person, place, and time.      Cranial Nerves: No cranial nerve deficit.   Psychiatric:         Behavior: Behavior normal.         Thought Content: Thought content normal.         No  visits with results within 2 Month(s) from this visit.   Latest known visit with results is:   Orders Only on 11/25/2020   Component Date Value Ref Range Status   • Total Cholesterol 11/30/2020 159  0 - 200 mg/dL Final   • Triglycerides 11/30/2020 49  0 - 150 mg/dL Final   • HDL Cholesterol 11/30/2020 85* 40 - 60 mg/dL Final   • LDL Cholesterol  11/30/2020 63  0 - 100 mg/dL Final   • VLDL Cholesterol 11/30/2020 11  5 - 40 mg/dL Final   • LDL/HDL Ratio 11/30/2020 0.76   Final   • Glucose 11/30/2020 101* 65 - 99 mg/dL Final   • BUN 11/30/2020 14  8 - 23 mg/dL Final   • Creatinine 11/30/2020 0.75  0.57 - 1.00 mg/dL Final   • Sodium 11/30/2020 141  136 - 145 mmol/L Final   • Potassium 11/30/2020 4.1  3.5 - 5.2 mmol/L Final   • Chloride 11/30/2020 107  98 - 107 mmol/L Final   • CO2 11/30/2020 25.3  22.0 - 29.0 mmol/L Final   • Calcium 11/30/2020 8.8  8.6 - 10.5 mg/dL Final   • Total Protein 11/30/2020 6.7  6.0 - 8.5 g/dL Final   • Albumin 11/30/2020 4.50  3.50 - 5.20 g/dL Final   • ALT (SGPT) 11/30/2020 24  1 - 33 U/L Final   • AST (SGOT) 11/30/2020 22  1 - 32 U/L Final   • Alkaline Phosphatase 11/30/2020 41  39 - 117 U/L Final   • Total Bilirubin 11/30/2020 0.6  0.0 - 1.2 mg/dL Final   • eGFR Non African Amer 11/30/2020 77  >60 mL/min/1.73 Final   • Globulin 11/30/2020 2.2  gm/dL Final   • A/G Ratio 11/30/2020 2.0  g/dL Final   • BUN/Creatinine Ratio 11/30/2020 18.7  7.0 - 25.0 Final   • Anion Gap 11/30/2020 8.7  5.0 - 15.0 mmol/L Final       Diagnoses and all orders for this visit:    1. Lower abdominal pain (Primary)      Patient is a 65-year-old female with history of diverticulosis as well as history of colon polyps last colonoscopy by Dr. Menard in 2019 reports a history of hemangioma in the liver status post CT as well as ultrasound which showed no significant change.  Patient's LFTs have been persistently normal over the last 6 months.  Patient also reports 3 episodes of lower abdominal pain initially thought  related to UTI but reports while the first couple of times it was treated with antibiotics for UTI that did not seem to help.  Patient then self diagnosed her diverticulosis is the cause.  Patient reports when the attacks occur she stopped eating and eventually over several days she improves.  Patient denies any fever chills may have had some association with some loose stools but not sure.  Patient did feel that having a bowel movement did seem to help but did not feel like she emptied.  Patient reports primary gave her Levsin with some improvement but again not resolution of her symptoms.  Patient now referred for further recommendations.  At this point she has had no pain for the last 3 months.  Patient instructed if occurs again she needs to call immediately so we can scan her abdomen for signs of diverticulitis versus other cause of her symptoms.  Will follow clinically.

## 2021-08-04 ENCOUNTER — TELEPHONE (OUTPATIENT)
Dept: FAMILY MEDICINE CLINIC | Facility: CLINIC | Age: 66
End: 2021-08-04

## 2021-08-04 RX ORDER — ROSUVASTATIN CALCIUM 10 MG/1
10 TABLET, COATED ORAL DAILY
Qty: 90 TABLET | Refills: 1 | Status: SHIPPED | OUTPATIENT
Start: 2021-08-04 | End: 2021-08-19

## 2021-08-04 NOTE — TELEPHONE ENCOUNTER
Caller: Aurora Fisher    Relationship: Self    Best call back number: [phone]    Medication needed:   Requested Prescriptions     Pending Prescriptions Disp Refills   • rosuvastatin (CRESTOR) 10 MG tablet 90 tablet 1     Sig: Take 1 tablet by mouth Daily.       What is the patient's preferred pharmacy: [facility] - [facility] [address] [facility] - [phone] [facility] [phone] FX

## 2021-08-04 NOTE — TELEPHONE ENCOUNTER
PATIENT CALLED STATING IS DUE FOR A PROLIA SHOT IN September AND USUALLY A PRIOR AUTH IS REQUIRED, WILL NEED TO HAVE PA DEPT TO START THE PROCESS.   IS SURE WILL NEED LABS BEFORE PROLIA SHOT TO CHECK FOR CALCIUM, WOULD LIKE TO HAVE OTHER LABS LIKE VIT D AND CHOLESTEROL CHECKED AS WELL.  REQUESTED A CALL BACK ASAP.    [phone]

## 2021-08-05 NOTE — TELEPHONE ENCOUNTER
We have not seen patient since 10/20 and lab/dexa scan done by GYN, no record shot since 2018 l/m on v/m f/u here appointment or contact us for actual appt.

## 2021-08-06 NOTE — TELEPHONE ENCOUNTER
Patient informed to get from GYN we do not order this,   Also informed must be seen to cont. To get meds  not just labs. Patient argumentive and said no reason to come in if not sick

## 2021-08-19 ENCOUNTER — TELEPHONE (OUTPATIENT)
Dept: FAMILY MEDICINE CLINIC | Facility: CLINIC | Age: 66
End: 2021-08-19

## 2021-08-19 DIAGNOSIS — J02.9 SORE THROAT: ICD-10-CM

## 2021-08-19 DIAGNOSIS — R05.9 COUGH: Primary | ICD-10-CM

## 2021-08-19 NOTE — TELEPHONE ENCOUNTER
Wants covid test please put in order  Sore throat-fatigue  Has had both vaccines  No known exposure

## 2021-08-19 NOTE — TELEPHONE ENCOUNTER
Provider: DR SAMPSON  Caller: REJI MOONEY  Relationship to Patient: PATIENT    Phone Number: [phone]  Reason for Call: PATIENT STATES THAT SHE STARTED WITH A SORE THROAT ON 08/16/2021 BUT IT IS FEELING BETTER NOW.  SHE IS NOW STARTING TO GET A LITTLE CONGESTION AND IS HAVING FATIGUE.  SHE IS JUST CONCERNED AND WANTS TO KNOW IF SHE SHOULD TEST FOR COVID.  SHE STATES SHE IS VACCINATED.

## 2021-08-19 NOTE — TELEPHONE ENCOUNTER
Lm informing pt if having symptoms should be evaluated or get covid test done at one of the testing facilities

## 2021-08-22 ENCOUNTER — TELEPHONE (OUTPATIENT)
Dept: URGENT CARE | Facility: CLINIC | Age: 66
End: 2021-08-22

## 2021-08-22 DIAGNOSIS — J20.9 ACUTE BRONCHITIS, UNSPECIFIED ORGANISM: Primary | ICD-10-CM

## 2021-08-22 RX ORDER — AZITHROMYCIN 250 MG/1
TABLET, FILM COATED ORAL
Qty: 6 TABLET | Refills: 0 | Status: SHIPPED | OUTPATIENT
Start: 2021-08-22 | End: 2021-11-04

## 2021-08-22 NOTE — TELEPHONE ENCOUNTER
D/w pt.  C/o cough productive of green; T - 99: no other c/o.  Retired RN; request abx; responds well to zpak.  P - zpak x 1; o/w same; recheck prn.

## 2021-09-02 ENCOUNTER — TELEPHONE (OUTPATIENT)
Dept: FAMILY MEDICINE CLINIC | Facility: CLINIC | Age: 66
End: 2021-09-02

## 2021-09-02 DIAGNOSIS — E55.9 VITAMIN D DEFICIENCY, UNSPECIFIED: ICD-10-CM

## 2021-09-02 DIAGNOSIS — R79.89 ELEVATED LFTS: Primary | ICD-10-CM

## 2021-09-02 DIAGNOSIS — E78.5 DYSLIPIDEMIA: ICD-10-CM

## 2021-09-02 NOTE — TELEPHONE ENCOUNTER
PATIENT IS ABOUT TO HAVE SOME LABS DRAWN AT DR ARLETH RAMOS'S OFFICE AND SHE WAS WANTING TO HAVE WHATEVER LABS DR SAMPSON WILL WANT FOR HER SO SHE DOESN'T HAVE TO GET BLOOD DRAWN TWICE.  THE LAB'S PHONE NUMBER IS JJ AND HER NUMBER [phone]. MIGHT BE HAVING LABS DRAWN THIS WEEK OR NEXT

## 2021-10-04 ENCOUNTER — TELEPHONE (OUTPATIENT)
Dept: FAMILY MEDICINE CLINIC | Facility: CLINIC | Age: 66
End: 2021-10-04

## 2021-10-04 DIAGNOSIS — Z78.0 MENOPAUSE: ICD-10-CM

## 2021-10-04 DIAGNOSIS — M80.00XD AGE-RELATED OSTEOPOROSIS WITH CURRENT PATHOLOGICAL FRACTURE WITH ROUTINE HEALING, SUBSEQUENT ENCOUNTER: Primary | ICD-10-CM

## 2021-10-04 NOTE — TELEPHONE ENCOUNTER
Caller: Aurora Fisher    Relationship: Self    Best call back number: [phone]    What medication are you requesting: ANTI DEPRESSANT  What are your current symptoms: NOT SLEEPING, FEELING LIKE CRYING, FEELS LIKE SHE IS A WALKING DEAD PERSON, FEELING ANXIOUS AS WELL    How long have you been experiencing symptoms: 2 WEEKS    Have you had these symptoms before:    [x] Yes  [] No    Have you been treated for these symptoms before:   [x] Yes  [] No    If a prescription is needed, what is your preferred pharmacy and phone number:  [facility] [address] [place] [address] [address] AT [facility] [address]  - [phone] [facility] [phone] FX            Additional notes: PATIENT STATES SHE WAS ON ANTI DEPRESSANTS 10 YRS AGO

## 2021-10-04 NOTE — TELEPHONE ENCOUNTER
Caller: Aurora Fisher    Relationship: Self    Best call back number: [phone]    What orders are you requesting (i.e. lab or imaging): BONE DENSITY ORDER    In what timeframe would the patient need to come in: AS SOON AS POSSIBLE     Where will you receive your lab/imaging services: [facility] DOESN'T MATTER WHICH LOCATION

## 2021-10-04 NOTE — TELEPHONE ENCOUNTER
She will need appt with RLS to discuss medication. If any worsening symptoms, depressed mood, or suicidal thoughts recommend ER or [facility] for evaluation. Please make sure she is not suicidal and send to ER if symptoms.

## 2021-10-04 NOTE — TELEPHONE ENCOUNTER
Patient informed and she chooses not to go to Er or [facility] for evaluation due to expense.  Advised this is what she will need to do with these symptoms.[email or web address]

## 2021-10-06 ENCOUNTER — TRANSCRIBE ORDERS (OUTPATIENT)
Dept: ADMINISTRATIVE | Facility: HOSPITAL | Age: 66
End: 2021-10-06

## 2021-10-06 DIAGNOSIS — Z12.31 VISIT FOR SCREENING MAMMOGRAM: Primary | ICD-10-CM

## 2021-10-13 ENCOUNTER — APPOINTMENT (OUTPATIENT)
Dept: WOMENS IMAGING | Facility: HOSPITAL | Age: 66
End: 2021-10-13

## 2021-10-13 PROCEDURE — 77063 BREAST TOMOSYNTHESIS BI: CPT | Performed by: RADIOLOGY

## 2021-10-13 PROCEDURE — 77067 SCR MAMMO BI INCL CAD: CPT | Performed by: RADIOLOGY

## 2021-10-22 ENCOUNTER — OFFICE VISIT (OUTPATIENT)
Dept: ORTHOPEDIC SURGERY | Facility: CLINIC | Age: 66
End: 2021-10-22

## 2021-10-22 VITALS — BODY MASS INDEX: 22.13 KG/M2 | WEIGHT: 146 LBS | TEMPERATURE: 96.9 F | HEIGHT: 68 IN

## 2021-10-22 DIAGNOSIS — M11.262 CHONDROCALCINOSIS OF LEFT KNEE: ICD-10-CM

## 2021-10-22 DIAGNOSIS — M25.562 BILATERAL CHRONIC KNEE PAIN: Primary | ICD-10-CM

## 2021-10-22 DIAGNOSIS — G89.29 BILATERAL CHRONIC KNEE PAIN: Primary | ICD-10-CM

## 2021-10-22 DIAGNOSIS — M25.561 BILATERAL CHRONIC KNEE PAIN: Primary | ICD-10-CM

## 2021-10-22 DIAGNOSIS — M17.0 PRIMARY OSTEOARTHRITIS OF BOTH KNEES: ICD-10-CM

## 2021-10-22 PROCEDURE — 99204 OFFICE O/P NEW MOD 45 MIN: CPT | Performed by: ORTHOPAEDIC SURGERY

## 2021-10-22 PROCEDURE — 73562 X-RAY EXAM OF KNEE 3: CPT | Performed by: ORTHOPAEDIC SURGERY

## 2021-10-22 NOTE — PROGRESS NOTES
"Patient Name: Aurora Fisher   YOB: 1955  Referring Primary Care Physician: José Miguel Vazquez MD  BMI: Body mass index is 22.2 kg/m².    Chief Complaint:    Chief Complaint   Patient presents with   • Right Knee - Establish Care, Pain   • Left Knee - Establish Care, Pain        HPI:     Aurora Fisher is a 66 y.o. female who presents today for evaluation of   Chief Complaint   Patient presents with   • Right Knee - Establish Care, Pain   • Left Knee - Establish Care, Pain   .  Patient is seen today complaining of bilateral knee pain but mainly on the left.  She says she is trying to be \"proactive\".  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  She exercises regularly and walks 1 to 2 miles but she has been scared to do it.  She recently joined a fitness club and she was can start a weight program etc. he gets achy pain from time to time generally take anti-inflammatories but denies any kind of contraindication she added that she is on Prolia for osteoporosis but after first dose she had all of her body aching I told her that she needs to take that up with the doctors prescribing the Prolia.      Subjective   Medications:   Home Medications:  Current Outpatient Medications on File Prior to Visit   Medication Sig   • acyclovir (ZOVIRAX) 400 MG tablet Daily use (Patient taking differently: As Needed.)   • ADVAIR DISKUS 250-50 MCG/DOSE DISKUS Inhale 1 puff 2 (Two) Times a Day. (Patient taking differently: Inhale 1 puff As Needed.)   • albuterol sulfate  (90 Base) MCG/ACT inhaler Inhale 2 puffs Every 6 (Six) Hours As Needed for Wheezing.   • amphetamine-dextroamphetamine (ADDERALL) 10 MG tablet 1 twice a day for ADHD (Patient taking differently: As Needed. 1 twice a day for ADHD)   • aspirin 81 MG chewable tablet Chew 81 mg Daily.   • azithromycin (Zithromax Z-Nabil) 250 MG tablet Take 2 tablets at the same time Day 1 and then 1 tablet every 24 " hour thereafter.   • calcium carbonate (OS-KILLIAN) 600 MG tablet Take 600 mg by mouth Daily.   • celecoxib (CeleBREX) 200 MG capsule Take 1 capsule by mouth Daily. (Patient taking differently: Take 200 mg by mouth As Needed.)   • cholecalciferol (VITAMIN D3) 1000 units tablet Take 1,000 Units by mouth 2 (Two) Times a Day.   • ESTRING 2 MG vaginal ring As Needed.   • Fluticasone Furoate-Vilanterol (BREO ELLIPTA IN) Inhale. Likes better than Advair   • hydrOXYzine (ATARAX) 25 MG tablet Take 1 tablet by mouth Every 8 (Eight) Hours As Needed for Anxiety (Do not drive).   • Hyoscyamine Sulfate SL (Levsin/SL) 0.125 MG sublingual tablet Place 0.125 mg under the tongue 4 (Four) Times a Day As Needed (cramps).   • loratadine (Claritin) 10 MG tablet Take 10 mg by mouth Daily.   • montelukast (SINGULAIR) 10 MG tablet Take 1 tablet by mouth Every Night. (Patient taking differently: Take 10 mg by mouth As Needed.)   • pramipexole (MIRAPEX) 1 MG tablet Take 1 tablet by mouth Daily. (Patient taking differently: Take 1 mg by mouth As Needed.)   • Prolia 60 MG/ML solution prefilled syringe syringe    • rosuvastatin (CRESTOR) 10 MG tablet      No current facility-administered medications on file prior to visit.     Current Medications:  Scheduled Meds:  Continuous Infusions:No current facility-administered medications for this visit.    PRN Meds:.    I have reviewed the patient's medical history in detail and updated the computerized patient record.  Review and summarization of old records includes:    Past Medical History:   Diagnosis Date   • ADHD (attention deficit hyperactivity disorder)    • Allergic    • Asthma    • Cataract    • Osteopenia         Past Surgical History:   Procedure Laterality Date   • APPENDECTOMY     • COLONOSCOPY      2-3 years    • COLONOSCOPY N/A 5/31/2019    Procedure: COLONOSCOPY into cecum;  Surgeon: Sid Menard MD;  Location: [facility] ENDOSCOPY;  Service: Gastroenterology        Social History  "    Occupational History   • Not on file   Tobacco Use   • Smoking status: Never Smoker   • Smokeless tobacco: Never Used   Substance and Sexual Activity   • Alcohol use: Yes     Comment: occasional   • Drug use: No   • Sexual activity: Defer      Social History     Social History Narrative   • Not on file        Family History   Problem Relation Age of Onset   • Colon polyps Mother    • Colon polyps Sister    • Colon cancer Maternal Grandfather        ROS: 14 point review of systems was performed and all other systems were reviewed and are negative except for documented findings in HPI and today's encounter.     Allergies:   Allergies   Allergen Reactions   • Penicillins Rash   • Sulfa Antibiotics Rash     Constitutional:  Denies fever, shaking or chills   Eyes:  Denies change in visual acuity   HENT:  Denies nasal congestion or sore throat   Respiratory:  Denies cough or shortness of breath   Cardiovascular:  Denies chest pain or severe LE edema   GI:  Denies abdominal pain, nausea, vomiting, bloody stools or diarrhea   Musculoskeletal:  Numbness, tingling, pain, or loss of motor function only as noted above in history of present illness.  : Denies painful urination or hematuria  Integument:  Denies rash, lesion or ulceration   Neurologic:  Denies headache or focal weakness  Endocrine:  Denies lymphadenopathy  Psych:  Denies confusion or change in mental status   Hem:  Denies active bleeding    OBJECTIVE:  Physical Exam: 66 y.o. female  Wt Readings from Last 3 Encounters:   10/22/21 66.2 kg (146 lb)   04/06/21 68.4 kg (150 lb 12.8 oz)   10/21/20 68.9 kg (152 lb)     Ht Readings from Last 1 Encounters:   10/22/21 172.7 cm (68\")     Body mass index is 22.2 kg/m².  Vitals:    10/22/21 0930   Temp: 96.9 °F (36.1 °C)     Vital signs reviewed.     General Appearance:    Alert, cooperative, in no acute distress                  Eyes: conjunctiva clear  ENT: external ears and nose atraumatic  CV: no peripheral " edema  Resp: normal respiratory effort  Skin: no rashes or wounds; normal turgor  Psych: mood and affect appropriate  Lymph: no nodes appreciated  Neuro: gross sensation intact  Vascular:  Palpable peripheral pulse in noted extremity  Musculoskeletal Extremities: Today shows pleasant lady she has crepitation synovitis swelling bilateral knees she has pretty good range of motion however Katt's is negative she does have some joint line tenderness a little more swelling on the left than the right perhaps small Baker's cyst her calves are otherwise soft    Radiology:   P lateral 40 degree PA x-ray bilateral knees taken the office today without comparison view for pain show arthritis in her knees with some chondrocalcinosis on the left.  This is all explained        Assessment:     ICD-10-CM ICD-9-CM   1. Bilateral chronic knee pain  M25.561 719.46    M25.562 338.29    G89.29    2. Primary osteoarthritis of both knees  M17.0 715.16   3. Chondrocalcinosis of left knee  M11.262 275.49     712.36        MDM/Plan:   The diagnosis(es), natural history, pathophysiology and treatment for diagnosis(es) were discussed. Opportunity given and questions answered.  Biomechanics of pertinent body areas discussed.  When appropriate, the use of ambulatory aids discussed.    BMI:  The concept of BMI body mass index and its importance and implications discussed.    EXERCISES:  Advice on benefits of, and types of regular/moderate exercise pertaining to orthopedic diagnosis(es).  MEDICATIONS:  The risks, benefits, warnings,side effects and alternatives of medications discussed.  Inflammation/pain control; with cold, heat, elevation and/or liniments discussed as appropriate  HOME EXERCISE/PT program encouraged  MEDICAL RECORDS reviewed from other provider(s) for past and current medical history pertinent to this complaint.  Instructed in biomechanics and gave advice on exercise programs at the gym I talked her about things to look for  and to come back she is having problems    10/22/2021    Dragon software used for dictation of this encounter.

## 2021-11-04 ENCOUNTER — OFFICE VISIT (OUTPATIENT)
Dept: FAMILY MEDICINE CLINIC | Facility: CLINIC | Age: 66
End: 2021-11-04

## 2021-11-04 VITALS
SYSTOLIC BLOOD PRESSURE: 104 MMHG | HEART RATE: 68 BPM | WEIGHT: 150.4 LBS | TEMPERATURE: 98.4 F | HEIGHT: 68 IN | OXYGEN SATURATION: 97 % | BODY MASS INDEX: 22.79 KG/M2 | DIASTOLIC BLOOD PRESSURE: 78 MMHG

## 2021-11-04 DIAGNOSIS — E78.5 DYSLIPIDEMIA: ICD-10-CM

## 2021-11-04 DIAGNOSIS — K57.90 DIVERTICULOSIS: ICD-10-CM

## 2021-11-04 DIAGNOSIS — E55.9 VITAMIN D DEFICIENCY, UNSPECIFIED: ICD-10-CM

## 2021-11-04 DIAGNOSIS — J45.20 MILD INTERMITTENT ASTHMA WITHOUT COMPLICATION: ICD-10-CM

## 2021-11-04 DIAGNOSIS — Z00.00 MEDICARE ANNUAL WELLNESS VISIT, SUBSEQUENT: Primary | ICD-10-CM

## 2021-11-04 PROBLEM — F32.A DEPRESSION: Status: ACTIVE | Noted: 2021-11-04

## 2021-11-04 PROCEDURE — 99214 OFFICE O/P EST MOD 30 MIN: CPT | Performed by: INTERNAL MEDICINE

## 2021-11-04 PROCEDURE — G0439 PPPS, SUBSEQ VISIT: HCPCS | Performed by: INTERNAL MEDICINE

## 2021-11-04 PROCEDURE — 1170F FXNL STATUS ASSESSED: CPT | Performed by: INTERNAL MEDICINE

## 2021-11-04 PROCEDURE — 96160 PT-FOCUSED HLTH RISK ASSMT: CPT | Performed by: INTERNAL MEDICINE

## 2021-11-04 PROCEDURE — 1125F AMNT PAIN NOTED PAIN PRSNT: CPT | Performed by: INTERNAL MEDICINE

## 2021-11-04 PROCEDURE — 1159F MED LIST DOCD IN RCRD: CPT | Performed by: INTERNAL MEDICINE

## 2021-11-04 RX ORDER — PHENOL 1.4 %
10 AEROSOL, SPRAY (ML) MUCOUS MEMBRANE NIGHTLY PRN
Qty: 30 TABLET | Refills: 3 | COMMUNITY
Start: 2021-11-04

## 2021-11-04 RX ORDER — CITALOPRAM 20 MG/1
20 TABLET ORAL DAILY
Qty: 30 TABLET | Refills: 3 | COMMUNITY
Start: 2021-11-04 | End: 2022-11-16

## 2021-11-04 RX ORDER — OMEPRAZOLE 40 MG/1
40 CAPSULE, DELAYED RELEASE ORAL DAILY
Qty: 30 CAPSULE | Refills: 3 | Status: SHIPPED | OUTPATIENT
Start: 2021-11-04 | End: 2022-11-16

## 2021-11-04 NOTE — PROGRESS NOTES
QUICK REFERENCE INFORMATION:  The ABCs of the Annual Wellness Visit    Subsequent Medicare Wellness Visit patient was seen for Medicare wellness exam.  Patient was seen for mild asthma.  Patient's asthma has been getting worse over the past several weeks.  Patient does have some Breo at home and will start it if needed.  Patient also has Advair but only takes it at certain occasions.  Patient knows not to take both together.  She is staying on her albuterol rescue inhaler.  Patient's diverticulosis been stable over the past several months.  She has had no abdominal pain or GI bleeding.  Patient's lipids treated with diet exercise and rosuvastatin 10 mg daily.  Patient did have labs today and results pending at time of dictation.  Patient does have vitamin D3 deficiency supplement with over-the-counter D3.    Dictated utilizing Dragon dictation. If there are questions or for further clarification, please contact me.    HEALTH RISK ASSESSMENT    1955    Recent Hospitalizations:  No hospitalization(s) within the last year..        Current Medical Providers:  Patient Care Team:  José Miguel Vazquez MD as PCP - General        Smoking Status:  Social History     Tobacco Use   Smoking Status Never Smoker   Smokeless Tobacco Never Used       Alcohol Consumption:  Social History     Substance and Sexual Activity   Alcohol Use Yes    Comment: occasional       Depression Screen:   PHQ-2/PHQ-9 Depression Screening 11/4/2021   Little interest or pleasure in doing things 0   Feeling down, depressed, or hopeless 0   Trouble falling or staying asleep, or sleeping too much 0   Feeling tired or having little energy 0   Poor appetite or overeating 0   Feeling bad about yourself - or that you are a failure or have let yourself or your family down 0   Trouble concentrating on things, such as reading the newspaper or watching television 0   Moving or speaking so slowly that other people could have noticed. Or the opposite - being so  fidgety or restless that you have been moving around a lot more than usual 0   Thoughts that you would be better off dead, or of hurting yourself in some way 0   Total Score 0   If you checked off any problems, how difficult have these problems made it for you to do your work, take care of things at home, or get along with other people? Not difficult at all       Health Habits and Functional and Cognitive Screening:  Functional & Cognitive Status 11/4/2021   Do you have difficulty preparing food and eating? No   Do you have difficulty bathing yourself, getting dressed or grooming yourself? No   Do you have difficulty using the toilet? No   Do you have difficulty moving around from place to place? No   Do you have trouble with steps or getting out of a bed or a chair? No   Current Diet Well Balanced Diet   Dental Exam Up to date   Eye Exam Up to date   Exercise (times per week) 3 times per week   Current Exercises Include Aerobics   Current Exercise Activities Include -   Do you need help using the phone?  No   Are you deaf or do you have serious difficulty hearing?  No   Do you need help with transportation? No   Do you need help shopping? No   Do you need help preparing meals?  No   Do you need help with housework?  No   Do you need help with laundry? No   Do you need help taking your medications? No   Do you need help managing money? No   Do you ever drive or ride in a car without wearing a seat belt? No   Have you felt unusual stress, anger or loneliness in the last month? No   Who do you live with? Alone   If you need help, do you have trouble finding someone available to you? No   Have you been bothered in the last four weeks by sexual problems? No   Do you have difficulty concentrating, remembering or making decisions? No           Does the patient have evidence of cognitive impairment? No    Aspirin use counseling: Does not need ASA (and currently is not on it)      Recent Lab Results:  CMP:  Lab Results    Component Value Date    BUN 14 11/30/2020    CREATININE 0.75 11/30/2020    EGFRIFNONA 77 11/30/2020    BCR 18.7 11/30/2020     11/30/2020    K 4.1 11/30/2020    CO2 25.3 11/30/2020    CALCIUM 8.8 11/30/2020    ALBUMIN 4.50 11/30/2020    BILITOT 0.6 11/30/2020    ALKPHOS 41 11/30/2020    AST 22 11/30/2020    ALT 24 11/30/2020     Lipid Panel:  Lab Results   Component Value Date    CHOL 159 11/30/2020    TRIG 49 11/30/2020    HDL 85 (H) 11/30/2020    VLDL 11 11/30/2020    LDLHDL 0.76 11/30/2020     HbA1c:  Lab Results   Component Value Date    HGBA1C 5.30 09/23/2020       Visual Acuity:  No exam data present    Age-appropriate Screening Schedule:  Refer to the list below for future screening recommendations based on patient's age, sex and/or medical conditions. Orders for these recommended tests are listed in the plan section. The patient has been provided with a written plan.    Health Maintenance   Topic Date Due   • TDAP/TD VACCINES (1 - Tdap) Never done   • MAMMOGRAM  07/30/2021   • PAP SMEAR  03/01/2022   • LIPID PANEL  09/10/2022   • DXA SCAN  10/13/2023   • INFLUENZA VACCINE  Completed   • ZOSTER VACCINE  Completed        Subjective   History of Present Illness    Aurora Fisher is a 66 y.o. female who presents for an Subsequent Wellness Visit.    The following portions of the patient's history were reviewed and updated as appropriate: allergies, current medications, past family history, past medical history, past social history, past surgical history and problem list.    Outpatient Medications Prior to Visit   Medication Sig Dispense Refill   • acyclovir (ZOVIRAX) 400 MG tablet Daily use (Patient taking differently: As Needed.) 90 tablet 3   • albuterol sulfate  (90 Base) MCG/ACT inhaler Inhale 2 puffs Every 6 (Six) Hours As Needed for Wheezing. 3 inhaler 3   • calcium carbonate (OS-KILLIAN) 600 MG tablet Take 600 mg by mouth Daily.     • celecoxib (CeleBREX) 200 MG capsule Take 1 capsule by  mouth Daily. (Patient taking differently: Take 200 mg by mouth As Needed.) 90 capsule 3   • cholecalciferol (VITAMIN D3) 1000 units tablet Take 1,000 Units by mouth 2 (Two) Times a Day.     • Fluticasone Furoate-Vilanterol (BREO ELLIPTA IN) Inhale. Likes better than Advair     • hydrOXYzine (ATARAX) 25 MG tablet Take 1 tablet by mouth Every 8 (Eight) Hours As Needed for Anxiety (Do not drive). (Patient taking differently: Take 25 mg by mouth Every 8 (Eight) Hours As Needed for Anxiety (Do not drive). PRN) 30 tablet 0   • loratadine (Claritin) 10 MG tablet Take 10 mg by mouth Daily.     • montelukast (SINGULAIR) 10 MG tablet Take 1 tablet by mouth Every Night. (Patient taking differently: Take 10 mg by mouth As Needed.) 90 tablet 3   • pramipexole (MIRAPEX) 1 MG tablet Take 1 tablet by mouth Daily. (Patient taking differently: Take 1 mg by mouth As Needed.) 90 tablet 3   • Prolia 60 MG/ML solution prefilled syringe syringe      • rosuvastatin (CRESTOR) 10 MG tablet      • ADVAIR DISKUS 250-50 MCG/DOSE DISKUS Inhale 1 puff 2 (Two) Times a Day. (Patient taking differently: Inhale 1 puff As Needed.) 180 each 3   • Hyoscyamine Sulfate SL (Levsin/SL) 0.125 MG sublingual tablet Place 0.125 mg under the tongue 4 (Four) Times a Day As Needed (cramps). 120 each 1   • amphetamine-dextroamphetamine (ADDERALL) 10 MG tablet 1 twice a day for ADHD (Patient taking differently: As Needed. 1 twice a day for ADHD) 60 tablet 0   • aspirin 81 MG chewable tablet Chew 81 mg Daily.     • azithromycin (Zithromax Z-Nabil) 250 MG tablet Take 2 tablets at the same time Day 1 and then 1 tablet every 24 hour thereafter. 6 tablet 0   • ESTRING 2 MG vaginal ring As Needed.       No facility-administered medications prior to visit.       Patient Active Problem List   Diagnosis   • Osteopenia   • Cataract   • Asthma   • Allergic   • ADHD (attention deficit hyperactivity disorder)   • Prominent abdominal aortic pulsation   • Dyslipidemia   • History of  colon polyps   • Osteoporosis   • Hepatic hemangioma   • Diverticulosis   • Depression       Advance Care Planning:  ACP discussion was held with the patient during this visit. Patient has an advance directive in EMR which is still valid.     Identification of Risk Factors:  Risk factors include: NA.    Review of Systems   Constitutional: Negative for fatigue and fever.   HENT: Positive for congestion. Negative for trouble swallowing.    Eyes: Negative for discharge and visual disturbance.   Respiratory: Negative for choking and shortness of breath.    Cardiovascular: Negative for chest pain and palpitations.   Gastrointestinal: Negative for abdominal pain and blood in stool.   Endocrine: Negative.    Genitourinary: Negative for genital sores and hematuria.   Musculoskeletal: Negative for gait problem and joint swelling.   Skin: Negative for color change, pallor, rash and wound.   Allergic/Immunologic: Positive for environmental allergies. Negative for immunocompromised state.   Neurological: Negative for facial asymmetry and speech difficulty.   Psychiatric/Behavioral: Negative for hallucinations and suicidal ideas.       Compared to one year ago, the patient feels her physical health is the same.  Compared to one year ago, the patient feels her mental health is worse.    Objective     Physical Exam  Vitals and nursing note reviewed.   Constitutional:       Appearance: Normal appearance. She is well-developed.   HENT:      Head: Normocephalic and atraumatic.      Nose: Nose normal.      Mouth/Throat:      Mouth: Mucous membranes are moist.      Pharynx: Oropharynx is clear.   Eyes:      Extraocular Movements: Extraocular movements intact.      Conjunctiva/sclera: Conjunctivae normal.      Pupils: Pupils are equal, round, and reactive to light.   Cardiovascular:      Rate and Rhythm: Normal rate and regular rhythm.      Heart sounds: Normal heart sounds. No murmur heard.  No friction rub. No gallop.    Pulmonary:     "  Effort: Pulmonary effort is normal. No respiratory distress.      Breath sounds: Normal breath sounds. No stridor. No wheezing, rhonchi or rales.   Chest:      Chest wall: No tenderness.   Abdominal:      General: Bowel sounds are normal.      Palpations: Abdomen is soft.   Musculoskeletal:         General: Normal range of motion.      Cervical back: Normal range of motion and neck supple.   Skin:     General: Skin is warm and dry.   Neurological:      General: No focal deficit present.      Mental Status: She is alert and oriented to person, place, and time. Mental status is at baseline.   Psychiatric:         Mood and Affect: Mood normal.         Behavior: Behavior normal.         Thought Content: Thought content normal.         Judgment: Judgment normal.         Vitals:    11/04/21 0953   BP: 104/78   BP Location: Left arm   Patient Position: Sitting   Cuff Size: Adult   Pulse: 68   Temp: 98.4 °F (36.9 °C)   TempSrc: Temporal   SpO2: 97%   Weight: 68.2 kg (150 lb 6.4 oz)   Height: 172.7 cm (68\")   PainSc:   2   PainLoc: Hand       Patient's Body mass index is 22.87 kg/m². indicating that she is within normal range (BMI 18.5-24.9). No BMI management plan needed..      Assessment/Plan #1 add maintenance inhaler but do not take Breo and Advair at the same time.  #2 labs #3 continue present diet and activity levels  Patient Self-Management and Personalized Health Advice  The patient has been provided with information about: NA and preventive services including:   · NA.    Visit Diagnoses:    ICD-10-CM ICD-9-CM   1. Medicare annual wellness visit, subsequent  Z00.00 V70.0   2. Dyslipidemia  E78.5 272.4   3. Diverticulosis  K57.90 562.10   4. Mild intermittent asthma without complication  J45.20 493.90   5. Vitamin D deficiency, unspecified   E55.9 268.9       Orders Placed This Encounter   Procedures   • CBC (No Diff)     Standing Status:   Future     Standing Expiration Date:   11/4/2022     Order Specific " Question:   Release to patient     Answer:   Immediate   • Comprehensive Metabolic Panel     Order Specific Question:   Release to patient     Answer:   Immediate   • Lipid Panel   • Vitamin D 25 Hydroxy     Order Specific Question:   Release to patient     Answer:   Immediate       Outpatient Encounter Medications as of 11/4/2021   Medication Sig Dispense Refill   • acyclovir (ZOVIRAX) 400 MG tablet Daily use (Patient taking differently: As Needed.) 90 tablet 3   • albuterol sulfate  (90 Base) MCG/ACT inhaler Inhale 2 puffs Every 6 (Six) Hours As Needed for Wheezing. 3 inhaler 3   • calcium carbonate (OS-KILLIAN) 600 MG tablet Take 600 mg by mouth Daily.     • celecoxib (CeleBREX) 200 MG capsule Take 1 capsule by mouth Daily. (Patient taking differently: Take 200 mg by mouth As Needed.) 90 capsule 3   • cholecalciferol (VITAMIN D3) 1000 units tablet Take 1,000 Units by mouth 2 (Two) Times a Day.     • Fluticasone Furoate-Vilanterol (BREO ELLIPTA IN) Inhale. Likes better than Advair     • hydrOXYzine (ATARAX) 25 MG tablet Take 1 tablet by mouth Every 8 (Eight) Hours As Needed for Anxiety (Do not drive). (Patient taking differently: Take 25 mg by mouth Every 8 (Eight) Hours As Needed for Anxiety (Do not drive). PRN) 30 tablet 0   • loratadine (Claritin) 10 MG tablet Take 10 mg by mouth Daily.     • montelukast (SINGULAIR) 10 MG tablet Take 1 tablet by mouth Every Night. (Patient taking differently: Take 10 mg by mouth As Needed.) 90 tablet 3   • pramipexole (MIRAPEX) 1 MG tablet Take 1 tablet by mouth Daily. (Patient taking differently: Take 1 mg by mouth As Needed.) 90 tablet 3   • Prolia 60 MG/ML solution prefilled syringe syringe      • rosuvastatin (CRESTOR) 10 MG tablet      • ADVAIR DISKUS 250-50 MCG/DOSE DISKUS Inhale 1 puff 2 (Two) Times a Day. (Patient taking differently: Inhale 1 puff As Needed.) 180 each 3   • citalopram (CeleXA) 20 MG tablet Take 1 tablet by mouth Daily. 30 tablet 3   • Hyoscyamine  Sulfate SL (Levsin/SL) 0.125 MG sublingual tablet Place 0.125 mg under the tongue 4 (Four) Times a Day As Needed (cramps). 120 each 1   • Melatonin 10 MG tablet Take 1 tablet by mouth At Night As Needed (sleep). 30 tablet 3   • omeprazole (priLOSEC) 40 MG capsule Take 1 capsule by mouth Daily. 30 capsule 3   • [DISCONTINUED] amphetamine-dextroamphetamine (ADDERALL) 10 MG tablet 1 twice a day for ADHD (Patient taking differently: As Needed. 1 twice a day for ADHD) 60 tablet 0   • [DISCONTINUED] aspirin 81 MG chewable tablet Chew 81 mg Daily.     • [DISCONTINUED] azithromycin (Zithromax Z-Nabil) 250 MG tablet Take 2 tablets at the same time Day 1 and then 1 tablet every 24 hour thereafter. 6 tablet 0   • [DISCONTINUED] ESTRING 2 MG vaginal ring As Needed.       No facility-administered encounter medications on file as of 11/4/2021.       Reviewed use of high risk medication in the elderly: not applicable  Reviewed for potential of harmful drug interactions in the elderly: not applicable    Follow Up:  Return in about 6 months (around 5/4/2022), or if symptoms worsen or fail to improve, for Recheck.     An After Visit Summary and PPPS with all of these plans were given to the patient.

## 2021-11-04 NOTE — PATIENT INSTRUCTIONS
Medicare Wellness  Personal Prevention Plan of Service     Date of Office Visit:  2021  Encounter Provider:  José Miguel Vazquez MD  Place of Service:  [facility]  Patient Name: Aurora Fisher  :  1955    As part of the Medicare Wellness portion of your visit today, we are providing you with this personalized preventive plan of services (PPPS). This plan is based upon recommendations of the [place] Preventive Services Task Force (USPSTF) and the Advisory Committee on Immunization Practices (ACIP).    This lists the preventive care services that should be considered, and provides dates of when you are due. Items listed as completed are up-to-date and do not require any further intervention.    Health Maintenance   Topic Date Due   • TDAP/TD VACCINES (1 - Tdap) Never done   • HEPATITIS C SCREENING  Never done   • MAMMOGRAM  2021   • PAP SMEAR  2022   • LIPID PANEL  09/10/2022   • ANNUAL WELLNESS VISIT  2022   • DXA SCAN  10/13/2023   • COLORECTAL CANCER SCREENING  2029   • COVID-19 Vaccine  Completed   • INFLUENZA VACCINE  Completed   • Pneumococcal Vaccine 65+  Completed   • ZOSTER VACCINE  Completed       Orders Placed This Encounter   Procedures   • CBC (No Diff)     Standing Status:   Future     Standing Expiration Date:   2022     Order Specific Question:   Release to patient     Answer:   Immediate   • Comprehensive Metabolic Panel     Order Specific Question:   Release to patient     Answer:   Immediate   • Lipid Panel   • Vitamin D 25 Hydroxy     Order Specific Question:   Release to patient     Answer:   Immediate       Return in about 6 months (around 2022), or if symptoms worsen or fail to improve, for Recheck.

## 2021-11-22 ENCOUNTER — TELEPHONE (OUTPATIENT)
Dept: FAMILY MEDICINE CLINIC | Facility: CLINIC | Age: 66
End: 2021-11-22

## 2021-11-22 NOTE — TELEPHONE ENCOUNTER
I received a voicemail from patient. I returned her call and she wanted to let me know that she waited 1 hour and 35 minutes in the exam room for Dr. Vazquez when she last saw him on 11/4.    I apologized for her wait time and suggested she call next time she has an appointment with  to see  How he is running in terms of time.

## 2022-01-17 RX ORDER — ROSUVASTATIN CALCIUM 10 MG/1
TABLET, COATED ORAL
Qty: 90 TABLET | Refills: 1 | Status: SHIPPED | OUTPATIENT
Start: 2022-01-17 | End: 2022-07-18

## 2022-01-19 ENCOUNTER — APPOINTMENT (OUTPATIENT)
Dept: MAMMOGRAPHY | Facility: HOSPITAL | Age: 67
End: 2022-01-19

## 2022-03-25 ENCOUNTER — APPOINTMENT (OUTPATIENT)
Dept: BONE DENSITY | Facility: HOSPITAL | Age: 67
End: 2022-03-25

## 2022-07-18 RX ORDER — ROSUVASTATIN CALCIUM 10 MG/1
TABLET, COATED ORAL
Qty: 90 TABLET | Refills: 0 | Status: SHIPPED | OUTPATIENT
Start: 2022-07-18

## 2022-09-19 ENCOUNTER — TELEPHONE (OUTPATIENT)
Dept: GASTROENTEROLOGY | Facility: CLINIC | Age: 67
End: 2022-09-19

## 2022-09-19 DIAGNOSIS — R19.7 DIARRHEA, UNSPECIFIED TYPE: Primary | ICD-10-CM

## 2022-09-19 NOTE — TELEPHONE ENCOUNTER
Caller: Aurora Fisher    Relationship: Self    Best call back number: [phone]    What is the best time to reach you: ANYTIME    Who are you requesting to speak with (clinical staff, provider,  specific staff member): CLINICAL STAFF     What was the call regarding: LOOSER THAN NORMAL STOOL FOR 2-3MOS  - DO WANT STOOL SAMPLE  FOR 10/18/22 APPOINTMENT    Do you require a callback: YES

## 2022-09-20 NOTE — TELEPHONE ENCOUNTER
Caller: Aurora Fisher    Relationship to patient: Self    Best call back number: [phone]    Patient is needing: PT IS ADDING A SYMPTOM AND SHE IS NOW HAVING A BROWN STICKY MUCUS IN HER STOOL

## 2022-09-20 NOTE — TELEPHONE ENCOUNTER
Returned call    Pt stated since June she has been having frequent diarrhea.  She said she was treated for diverticulitis and placed on flagyl and Cirpo and ever since then she has been experiencing  the diarrhea.  She said it is worse when she eats, she feels bloated, and full with a poor appetite.  She has tried probiotics and metamucil and they have not helped.    She is scheduled to see Dr Fortune 10/18 and wonders if she will need any stool testing prior to that apt?

## 2022-09-20 NOTE — TELEPHONE ENCOUNTER
Hub staff attempted to follow warm transfer process and was unsuccessful     Caller: Aurora Fisher    Relationship to patient: Self    Best call back number: [phone]    Patient is needing: PATIENT RETURNED CALL TO NURSE. PLEASE REACH OUT TO PATIENT AGAIN. THANKS.

## 2022-09-23 NOTE — TELEPHONE ENCOUNTER
Hub staff attempted to follow warm transfer process and was unsuccessful     Caller: Aurora Fisher    Relationship to patient: Self    Best call back number: [phone]    Patient is needing: RETURNED CALL TO NURSE. PLEASE REACH OUT TO PATIENT AGAIN. THANK YOU

## 2022-09-27 NOTE — TELEPHONE ENCOUNTER
Patient called. No answer. Left message on an identified  mail. Advised to stop by the office to  a stool kit.     Per Dr. Fortune: stool for c.difficile.

## 2022-10-18 ENCOUNTER — TELEPHONE (OUTPATIENT)
Dept: GASTROENTEROLOGY | Facility: CLINIC | Age: 67
End: 2022-10-18

## 2022-10-18 ENCOUNTER — OFFICE VISIT (OUTPATIENT)
Dept: GASTROENTEROLOGY | Facility: CLINIC | Age: 67
End: 2022-10-18

## 2022-10-18 VITALS
HEIGHT: 68 IN | SYSTOLIC BLOOD PRESSURE: 108 MMHG | WEIGHT: 153 LBS | OXYGEN SATURATION: 94 % | HEART RATE: 73 BPM | DIASTOLIC BLOOD PRESSURE: 75 MMHG | BODY MASS INDEX: 23.19 KG/M2 | TEMPERATURE: 96.1 F

## 2022-10-18 DIAGNOSIS — K52.9 CHRONIC DIARRHEA: Primary | ICD-10-CM

## 2022-10-18 PROCEDURE — 99213 OFFICE O/P EST LOW 20 MIN: CPT | Performed by: INTERNAL MEDICINE

## 2022-10-18 NOTE — PROGRESS NOTES
Chief Complaint   Patient presents with   • Change in bowels       Aurora Fisher is a  67 y.o. female here for a follow up visit for change in bowel habits.    HPI     Patient 67-year-old female with history of ADHD, asthma and osteopenia who presented to primary care with lower abdominal pain treated without imaging for diverticulitis.  Patient given 7 days of Cipro and Flagyl and developed diarrhea with mucus.  Patient reports has been taking rotating probiotics without improvement.  Patient was prescribed a steroid pack and macrolide antibiotic in August for a wasp sting but reports she never took the antibiotic.  Patient was also given azithromycin for bronchitis in September but the stools persisted to be abnormal.  Patient denies any fever chills no bright red blood per rectum or melena here for further recommendations.    Past Medical History:   Diagnosis Date   • ADHD (attention deficit hyperactivity disorder)    • Allergic    • Asthma    • Cataract    • Osteopenia          Current Outpatient Medications:   •  acyclovir (ZOVIRAX) 400 MG tablet, Daily use (Patient taking differently: As Needed.), Disp: 90 tablet, Rfl: 3  •  albuterol sulfate  (90 Base) MCG/ACT inhaler, Inhale 2 puffs Every 6 (Six) Hours As Needed for Wheezing., Disp: 3 inhaler, Rfl: 3  •  calcium carbonate (OS-KILLIAN) 600 MG tablet, Take 600 mg by mouth Daily., Disp: , Rfl:   •  cholecalciferol (VITAMIN D3) 1000 units tablet, Take 1,000 Units by mouth 2 (Two) Times a Day., Disp: , Rfl:   •  Fluticasone Furoate-Vilanterol (BREO ELLIPTA IN), Inhale. Likes better than Advair, Disp: , Rfl:   •  hydrOXYzine (ATARAX) 25 MG tablet, Take 1 tablet by mouth Every 8 (Eight) Hours As Needed for Anxiety (Do not drive). (Patient taking differently: Take 1 tablet by mouth Every 8 (Eight) Hours As Needed for Anxiety (Do not drive). PRN), Disp: 30 tablet, Rfl: 0  •  Hyoscyamine Sulfate SL (Levsin/SL) 0.125 MG sublingual tablet, Place 0.125 mg under  the tongue 4 (Four) Times a Day As Needed (cramps)., Disp: 120 each, Rfl: 1  •  loratadine (CLARITIN) 10 MG tablet, Take 10 mg by mouth Daily., Disp: , Rfl:   •  Melatonin 10 MG tablet, Take 1 tablet by mouth At Night As Needed (sleep)., Disp: 30 tablet, Rfl: 3  •  montelukast (SINGULAIR) 10 MG tablet, Take 1 tablet by mouth Every Night. (Patient taking differently: Take 1 tablet by mouth As Needed.), Disp: 90 tablet, Rfl: 3  •  pramipexole (MIRAPEX) 1 MG tablet, Take 1 tablet by mouth Daily. (Patient taking differently: Take 1 tablet by mouth As Needed.), Disp: 90 tablet, Rfl: 3  •  Prolia 60 MG/ML solution prefilled syringe syringe, , Disp: , Rfl:   •  rosuvastatin (CRESTOR) 10 MG tablet, TAKE 1 TABLET EVERY DAY, Disp: 90 tablet, Rfl: 0  •  ADVAIR DISKUS 250-50 MCG/DOSE DISKUS, Inhale 1 puff 2 (Two) Times a Day. (Patient taking differently: Inhale 1 puff As Needed.), Disp: 180 each, Rfl: 3  •  celecoxib (CeleBREX) 200 MG capsule, Take 1 capsule by mouth Daily. (Patient taking differently: Take 1 capsule by mouth As Needed.), Disp: 90 capsule, Rfl: 3  •  citalopram (CeleXA) 20 MG tablet, Take 1 tablet by mouth Daily., Disp: 30 tablet, Rfl: 3  •  omeprazole (priLOSEC) 40 MG capsule, Take 1 capsule by mouth Daily., Disp: 30 capsule, Rfl: 3    Allergies   Allergen Reactions   • Penicillins Rash   • Sulfa Antibiotics Rash       Social History     Socioeconomic History   • Marital status:    Tobacco Use   • Smoking status: Never   • Smokeless tobacco: Never   Substance and Sexual Activity   • Alcohol use: Yes     Comment: occasional   • Drug use: No   • Sexual activity: Defer       Family History   Problem Relation Age of Onset   • Colon polyps Mother    • Colon polyps Sister    • Colon cancer Maternal Grandfather        Review of Systems   Constitutional: Negative.    Respiratory: Negative.    Cardiovascular: Negative.    Gastrointestinal: Positive for diarrhea. Negative for abdominal distention, abdominal  pain, anal bleeding, blood in stool, constipation, nausea and rectal pain.   Musculoskeletal: Negative.    Skin: Negative.    Hematological: Negative.        Vitals:    10/18/22 0835   BP: 108/75   Pulse: 73   Temp: 96.1 °F (35.6 °C)   SpO2: 94%       Physical Exam  Vitals reviewed.   Constitutional:       Appearance: Normal appearance. She is well-developed and normal weight.   HENT:      Head: Normocephalic and atraumatic.   Eyes:      General: No scleral icterus.     Pupils: Pupils are equal, round, and reactive to light.   Cardiovascular:      Rate and Rhythm: Normal rate and regular rhythm.      Heart sounds: Normal heart sounds.   Pulmonary:      Effort: Pulmonary effort is normal.      Breath sounds: Normal breath sounds. No wheezing or rales.   Abdominal:      General: Bowel sounds are normal. There is no distension.      Palpations: Abdomen is soft. There is no mass.      Tenderness: There is no abdominal tenderness.      Hernia: No hernia is present.   Skin:     General: Skin is warm and dry.      Coloration: Skin is not jaundiced.      Findings: No rash.   Neurological:      General: No focal deficit present.      Mental Status: She is alert and oriented to person, place, and time.      Cranial Nerves: No cranial nerve deficit.   Psychiatric:         Behavior: Behavior normal.         Thought Content: Thought content normal.         Judgment: Judgment normal.         No visits with results within 2 Month(s) from this visit.   Latest known visit with results is:   Admission on 08/19/2021, Discharged on 08/19/2021   Component Date Value Ref Range Status   • SARS-CoV-2, VILMA 08/19/2021 Not Detected  Not Detected Final   • [facility] SARS-COV-2, VILMA 2 DAY TAT 08/19/2021 Performed   Final       Diagnoses and all orders for this visit:    1. Chronic diarrhea (Primary)  -     Case Request; Standing  -     Implement Anesthesia orders day of procedure.; Standing  -     Obtain informed consent; Standing  -     Case  Request      Patient 67-year-old female with history of ADHD, asthma and osteopenia last colonoscopy in 2019 was unremarkable who presented to primary care with lower abdominal pain treated without imaging in June with Cipro and Flagyl for possible diverticulitis.  While pain improved patient developed persistent diarrhea since then patient has had a wasp sting which she was treated with several days of steroids and bronchitis which she was treated with azithromycin and steroids for 1 day with persistence of loose stools with mucus intermittently.  Patient denies any fever chills no bright red blood per rectum or melena.  In view of persistent loose stools with ongoing use of rotating probiotics would recommend sigmoidoscopy with left colon biopsies to rule out postinfectious colitis.  We will follow-up clinically.

## 2022-10-18 NOTE — TELEPHONE ENCOUNTER
HAWK patient via telephone for. Scheduled 11/02/2022 with arrival time of 1145am. Prep paperwork mailed to verified address on file. Patient advised arrival time may change based on [facility] guidelines. HAWK MARIN

## 2022-10-24 ENCOUNTER — TELEPHONE (OUTPATIENT)
Dept: GASTROENTEROLOGY | Facility: CLINIC | Age: 67
End: 2022-10-24

## 2022-10-24 NOTE — TELEPHONE ENCOUNTER
Hub staff attempted to follow warm transfer process and was unsuccessful     Caller: Aurora Fisher    Relationship to patient: Self    Best call back number: [phone]    Patient is needing: CLARIFICATION ON SIGMOIDOSCOPY PREP. RECEIVED INSTRUCTIONS STATING CLEAR LIQUIDS 8 HOURS PRIOR TO PROCEDURE AND NOTHING AT ALL 2 HOURS PRIOR TO PROCEDURE.   THE OTHER DOCUMENT SAYS CLEAR LIQUIDS ONLY THE ENTIRE DAY PRIOR TO PROCEDURE     OKAY TO LEAVE VOICEMAIL

## 2022-10-24 NOTE — TELEPHONE ENCOUNTER
HAWK patient via telephone for. Scheduled 10/26/2022 with arrival time of 07:30A. Prep paperwork mailed to verified address on file. Patient advised arrival time may change based on [facility] guidelines. HAWK MAXWELL

## 2022-10-24 NOTE — TELEPHONE ENCOUNTER
Caller: Aurora Fisher    Relationship to patient: Self    Best call back number: [phone]    Patient is needing: AN EARLIER APPOINTMENT DUE TO INSURANCE CHANGE BEFORE HER SCHEDULED APPOINTMENT ON 11/2.

## 2022-10-25 ENCOUNTER — APPOINTMENT (OUTPATIENT)
Dept: WOMENS IMAGING | Facility: HOSPITAL | Age: 67
End: 2022-10-25

## 2022-10-25 PROCEDURE — 77063 BREAST TOMOSYNTHESIS BI: CPT | Performed by: RADIOLOGY

## 2022-10-25 PROCEDURE — 77067 SCR MAMMO BI INCL CAD: CPT | Performed by: RADIOLOGY

## 2022-10-26 ENCOUNTER — HOSPITAL ENCOUNTER (OUTPATIENT)
Facility: HOSPITAL | Age: 67
Setting detail: HOSPITAL OUTPATIENT SURGERY
Discharge: HOME | End: 2022-10-26
Attending: INTERNAL MEDICINE | Admitting: INTERNAL MEDICINE

## 2022-10-26 ENCOUNTER — ANESTHESIA (OUTPATIENT)
Dept: GASTROENTEROLOGY | Facility: HOSPITAL | Age: 67
End: 2022-10-26

## 2022-10-26 ENCOUNTER — ANESTHESIA EVENT (OUTPATIENT)
Dept: GASTROENTEROLOGY | Facility: HOSPITAL | Age: 67
End: 2022-10-26

## 2022-10-26 VITALS
OXYGEN SATURATION: 97 % | RESPIRATION RATE: 16 BRPM | TEMPERATURE: 98 F | HEART RATE: 69 BPM | WEIGHT: 154.4 LBS | SYSTOLIC BLOOD PRESSURE: 112 MMHG | BODY MASS INDEX: 23.4 KG/M2 | HEIGHT: 68 IN | DIASTOLIC BLOOD PRESSURE: 66 MMHG

## 2022-10-26 DIAGNOSIS — K52.9 CHRONIC DIARRHEA: ICD-10-CM

## 2022-10-26 PROCEDURE — 45331 SIGMOIDOSCOPY AND BIOPSY: CPT | Performed by: INTERNAL MEDICINE

## 2022-10-26 PROCEDURE — 88305 TISSUE EXAM BY PATHOLOGIST: CPT | Performed by: INTERNAL MEDICINE

## 2022-10-26 PROCEDURE — 25010000002 PROPOFOL 10 MG/ML EMULSION: Performed by: ANESTHESIOLOGY

## 2022-10-26 RX ORDER — SODIUM CHLORIDE, SODIUM LACTATE, POTASSIUM CHLORIDE, CALCIUM CHLORIDE 600; 310; 30; 20 MG/100ML; MG/100ML; MG/100ML; MG/100ML
30 INJECTION, SOLUTION INTRAVENOUS CONTINUOUS PRN
Status: DISCONTINUED | OUTPATIENT
Start: 2022-10-26 | End: 2022-10-26 | Stop reason: HOSPADM

## 2022-10-26 RX ORDER — PROPOFOL 10 MG/ML
VIAL (ML) INTRAVENOUS AS NEEDED
Status: DISCONTINUED | OUTPATIENT
Start: 2022-10-26 | End: 2022-10-26 | Stop reason: SURG

## 2022-10-26 RX ORDER — LIDOCAINE HYDROCHLORIDE 20 MG/ML
INJECTION, SOLUTION INFILTRATION; PERINEURAL AS NEEDED
Status: DISCONTINUED | OUTPATIENT
Start: 2022-10-26 | End: 2022-10-26 | Stop reason: SURG

## 2022-10-26 RX ADMIN — PROPOFOL 80 MG: 10 INJECTION, EMULSION INTRAVENOUS at 08:56

## 2022-10-26 RX ADMIN — PROPOFOL 100 MCG/KG/MIN: 10 INJECTION, EMULSION INTRAVENOUS at 08:58

## 2022-10-26 RX ADMIN — LIDOCAINE HYDROCHLORIDE 60 MG: 20 INJECTION, SOLUTION INFILTRATION; PERINEURAL at 08:56

## 2022-10-26 RX ADMIN — SODIUM CHLORIDE, POTASSIUM CHLORIDE, SODIUM LACTATE AND CALCIUM CHLORIDE 30 ML/HR: 600; 310; 30; 20 INJECTION, SOLUTION INTRAVENOUS at 08:38

## 2022-10-26 NOTE — ANESTHESIA POSTPROCEDURE EVALUATION
Patient: Aurora Fisher    Procedure Summary     Date: 10/26/22 [facility]    Anesthesia Start: 0854 Anesthesia Stop: 0920    Procedure: FLEXIBLE SIGMOIDOSCOPY WITH COLD BIOPSIES Diagnosis:       Chronic diarrhea      Internal hemorrhoids      Diverticulosis      (Chronic diarrhea [K52.9])    Surgeons: Ildefonso Fortune MD Provider: Nicolasa Kearns MD    Anesthesia Type: MAC ASA Status: 2          Anesthesia Type: MAC    Vitals  No vitals data found for the desired time range.          Post Anesthesia Care and Evaluation    Patient location during evaluation: bedside  Patient participation: complete - patient participated  Level of consciousness: awake  Pain management: adequate    Airway patency: patent  Anesthetic complications: No anesthetic complications    Cardiovascular status: acceptable  Respiratory status: acceptable  Hydration status: acceptable

## 2022-10-26 NOTE — ANESTHESIA PREPROCEDURE EVALUATION
Anesthesia Evaluation                  Airway   Mallampati: I  TM distance: >3 FB  Neck ROM: full  No difficulty expected  Dental - normal exam     Pulmonary - normal exam   (+) asthma,  Cardiovascular - normal exam        Neuro/Psych  (+) psychiatric history Depression,    GI/Hepatic/Renal/Endo    (+)   liver disease,     Musculoskeletal     Abdominal  - normal exam    Bowel sounds: normal.   Substance History      OB/GYN          Other                        Anesthesia Plan    ASA 2     MAC     intravenous induction     Anesthetic plan, risks, benefits, and alternatives have been provided, discussed and informed consent has been obtained with: patient.        CODE STATUS:

## 2022-10-27 LAB
LAB AP CASE REPORT: NORMAL
PATH REPORT.FINAL DX SPEC: NORMAL
PATH REPORT.GROSS SPEC: NORMAL

## 2022-11-09 ENCOUNTER — TELEPHONE (OUTPATIENT)
Dept: GASTROENTEROLOGY | Facility: CLINIC | Age: 67
End: 2022-11-09

## 2022-11-09 NOTE — TELEPHONE ENCOUNTER
"Returned call to pt.    She has reviewed her scope results on my chart    She is asking if there is anything else she can do for her diarrhea?  She said her symptoms have not changed since her last office visit with you on 10/18.  She describes her stool as :loose and mushy\" 2-3 per day, with morning urgency.  She said this has been going on since she was on antibiotics in June.  She is taking probiotics and metamucil, 2-3 spoon fulls per day.     Offered her a follow up in office to discuss and she declined.  Do you have any recommendations for her at this time?  "

## 2022-11-09 NOTE — TELEPHONE ENCOUNTER
Hub staff attempted to follow warm transfer process and was unsuccessful     Caller: Aurora Fisher    Relationship to patient: Self    Best call back number: [phone]    Patient is needing: PT IS TRYING TO REACH DR LEON. SHE HAS A MEDICAL QUESTION TO ASK HIM ABOUT A SYMPTOM SHE HAS. STILL HAS SYMPTOMS.

## 2022-11-10 NOTE — TELEPHONE ENCOUNTER
Caller: Aurora Fisher    Relationship to patient: Self    Best call back number: [phone]    Patient is needing: PT WANTED TO MAKE SURE THE LEON IS AWARE THAT SHE HAS DIARRHEA AND STOMACH BLOATING THAT IS GETTING WORSE.     PT WANTS SOMEONE TO CALL TODAY.

## 2022-11-10 NOTE — TELEPHONE ENCOUNTER
Per verbal order of Dr. Fortune:   Patient is to start Xifaxan 550 mg one tablet TID x14 days. #42 Refills 2.     If Xifaxan is too expensive start patient on Nortriptyline 10 mg one tablet nightly.     Patient called and notified of the above.   She verb understanding.

## 2022-11-10 NOTE — TELEPHONE ENCOUNTER
Called patient. She states she continues with her bloating, multiple soft stools through out the day and diminished appetite.   She states she is taking a probiotic and Metamucil which has not helped with her symptoms.   Advised will send an update to Dr. Fortune.

## 2022-11-15 ENCOUNTER — TELEPHONE (OUTPATIENT)
Dept: GASTROENTEROLOGY | Facility: CLINIC | Age: 67
End: 2022-11-15

## 2022-11-15 NOTE — TELEPHONE ENCOUNTER
Caller: Aurora Fisher    Relationship: Self    Best call back number: [phone]    What is the best time to reach you: ANYTIME     Who are you requesting to speak with (clinical staff, provider,  specific staff member): CLINICAL STAFF    What was the call regarding: PATIENT WOULD LIKE TO SPEAK WITH SOMEONE IN OFFICE REGARDING MEDICATION OPTIONS FOR XIFAXAN.    Do you require a callback: YES

## 2022-11-16 ENCOUNTER — OFFICE VISIT (OUTPATIENT)
Dept: GASTROENTEROLOGY | Facility: CLINIC | Age: 67
End: 2022-11-16

## 2022-11-16 ENCOUNTER — TELEPHONE (OUTPATIENT)
Dept: GASTROENTEROLOGY | Facility: CLINIC | Age: 67
End: 2022-11-16

## 2022-11-16 VITALS
TEMPERATURE: 96.8 F | SYSTOLIC BLOOD PRESSURE: 115 MMHG | OXYGEN SATURATION: 96 % | WEIGHT: 152.6 LBS | HEART RATE: 78 BPM | HEIGHT: 68 IN | BODY MASS INDEX: 23.13 KG/M2 | DIASTOLIC BLOOD PRESSURE: 82 MMHG

## 2022-11-16 DIAGNOSIS — R10.30 LOWER ABDOMINAL PAIN: Primary | ICD-10-CM

## 2022-11-16 DIAGNOSIS — R19.5 LOOSE STOOLS: ICD-10-CM

## 2022-11-16 PROCEDURE — 99214 OFFICE O/P EST MOD 30 MIN: CPT | Performed by: NURSE PRACTITIONER

## 2022-11-16 RX ORDER — DIPHENOXYLATE HYDROCHLORIDE AND ATROPINE SULFATE 2.5; .025 MG/1; MG/1
TABLET ORAL
COMMUNITY

## 2022-11-16 RX ORDER — TERBINAFINE HYDROCHLORIDE 250 MG/1
TABLET ORAL
COMMUNITY
Start: 2022-11-15

## 2022-11-16 NOTE — PROGRESS NOTES
Chief Complaint   Patient presents with   • Abdominal Pain       HPI    Aurora Fisher is a  67 y.o. female here for a follow up visit for abdominal pain with change in bowel habits.    This patient follows with Dr. Fortune, new to me.    History of ADHD, asthma, and osteopenia seen by Dr. Fortune in October after she had been treated empirically for suspected diverticulitis.    Reviewed recent sigmoidoscopy prompted for symptoms of diarrhea and suspected postinfectious colitis -- nonbleeding internal hemorrhoids and diverticulosis.    Postprocedural treatment of Xifaxan x14 days with consideration for nortriptyline if warranted.    Today she never took Xifaxan because it was cost prohibitive.  She complains of mid to lower abdominal pain.  Looser consistency stools but denies overt diarrhea.  Excessive gas and bloating and somewhat transient early satiety.  She is concerned about her pancreas.  No rectal bleeding.    Recent lab work which included a hemogram and LFTs normal.    Past Medical History:   Diagnosis Date   • ADHD (attention deficit hyperactivity disorder)    • Allergic    • Asthma    • Cataract    • Hyperlipidemia    • Osteopenia        Past Surgical History:   Procedure Laterality Date   • APPENDECTOMY     • COLONOSCOPY      2-3 years    • COLONOSCOPY N/A 5/31/2019    Procedure: COLONOSCOPY into cecum;  Surgeon: Sid Menard MD;  Location: [facility] ENDOSCOPY;  Service: Gastroenterology   • SIGMOIDOSCOPY N/A 10/26/2022    Procedure: FLEXIBLE SIGMOIDOSCOPY WITH COLD BIOPSIES;  Surgeon: Ildefonso Fortune MD;  Location: [facility] ENDOSCOPY;  Service: Gastroenterology;  Laterality: N/A;  PRE- CHRONIC DIARRHEA  POST- HEMORRHOIDS, DIVERTICULOSIS       Scheduled Meds:     Continuous Infusions: No current facility-administered medications for this visit.      PRN Meds:     Allergies   Allergen Reactions   • Penicillins Rash   • Sulfa Antibiotics Rash       Social History     Socioeconomic History   •  Marital status:    Tobacco Use   • Smoking status: Never   • Smokeless tobacco: Never   Vaping Use   • Vaping Use: Never used   Substance and Sexual Activity   • Alcohol use: Yes     Comment: occasional   • Drug use: No   • Sexual activity: Defer       Family History   Problem Relation Age of Onset   • Colon polyps Mother    • Colon polyps Sister    • Colon cancer Maternal Grandfather    • Malig Hyperthermia Neg Hx        Review of Systems   Constitutional: Negative for activity change, appetite change, fatigue, fever and unexpected weight change.   HENT: Negative for trouble swallowing.    Respiratory: Negative for apnea, cough, choking, chest tightness, shortness of breath and wheezing.    Cardiovascular: Negative for chest pain, palpitations and leg swelling.   Gastrointestinal: Positive for abdominal pain. Negative for abdominal distention, anal bleeding, blood in stool, constipation, diarrhea, nausea, rectal pain and vomiting.       Vitals:    11/16/22 1351   BP: 115/82   Pulse: 78   Temp: 96.8 °F (36 °C)   SpO2: 96%       Physical Exam  Constitutional:       Appearance: She is well-developed.   Abdominal:      General: Bowel sounds are normal. There is no distension.      Palpations: Abdomen is soft. There is no mass.      Tenderness: There is abdominal tenderness. There is no guarding.      Hernia: No hernia is present.          Comments: Tenderness noted in the area marked above   Skin:     General: Skin is warm and dry.      Capillary Refill: Capillary refill takes less than 2 seconds.   Neurological:      Mental Status: She is alert and oriented to person, place, and time.   Psychiatric:         Behavior: Behavior normal.       Assessment    Diagnoses and all orders for this visit:    1. Lower abdominal pain (Primary)  -     CT Abdomen Pelvis With Contrast; Future    2. Loose stools  -     CT Abdomen Pelvis With Contrast; Future    Other orders  -     riFAXIMin (Xifaxan) 550 MG tablet; Take 1  tablet by mouth 3 (Three) Times a Day for 14 days.  Dispense: 42 tablet; Refill: 0      Plan    Arrange CT of the abdomen and pelvis given her complaints of mid to lower abdominal pain and tenderness on physical examination  Move forward with Xifaxan as previously recommended post sigmoidoscopy  Samples provided for entire course of therapy  Further recommendations pending imaging results and patient's response to therapy         BLAYNE Oshea  [facility]  [address] [place] [address]  Office: [phone]

## 2022-11-16 NOTE — TELEPHONE ENCOUNTER
Called pt, she states she needs to talk to someone about her sx.  Made a f/u OV bucky with ARIA Barraza at 1:45pm today.

## 2022-11-16 NOTE — TELEPHONE ENCOUNTER
Hub staff attempted to follow warm transfer process and was unsuccessful     Caller: Auroar Fisher    Relationship to patient: Self    Best call back number: [phone]    Patient is needing: PT REQUESTS URGENT APPT/CONCERNS WITH ABD PAIN/SEE NOTES 11/9 AND 11/15

## 2022-11-17 ENCOUNTER — TELEPHONE (OUTPATIENT)
Dept: GASTROENTEROLOGY | Facility: CLINIC | Age: 67
End: 2022-11-17

## 2022-11-17 NOTE — TELEPHONE ENCOUNTER
Caller: Aurora Fisher    Relationship: Self    Best call back number: [phone]    What is the best time to reach you: ANYTIME OR LVM    Who are you requesting to speak with (clinical staff, provider,  specific staff member): CLINICAL STAFF    What was the call regarding: PT WANTS TO KNOW IF SHE CAN TAKE HER ACYCLOVIR WITH THE NEW MEDS PRESCRIBED YESTERDAY.     Do you require a callback: YES

## 2022-11-18 ENCOUNTER — TELEPHONE (OUTPATIENT)
Dept: GASTROENTEROLOGY | Facility: CLINIC | Age: 67
End: 2022-11-18

## 2022-11-18 NOTE — TELEPHONE ENCOUNTER
Hub staff attempted to follow warm transfer process and was unsuccessful     Caller: Aurora Fisher    Relationship to patient: Self    Best call back number: [phone]    Patient is needing:PT REQUESTS CALL BACK/SYMPTOMS FROM NEW MEDS  PT REPORTS BAD HEADACHE WANTS TO KNOW WHAT SHE CAN TAKE WITH MEDS TO ALLEVIATE PAIN. WHAT SHOULD SHE DO IF PAIN CONTINUES OVER THE WKEND. PLEASE CALL PT BACK.

## 2022-11-23 ENCOUNTER — TELEPHONE (OUTPATIENT)
Dept: GASTROENTEROLOGY | Facility: CLINIC | Age: 67
End: 2022-11-23

## 2022-11-28 ENCOUNTER — TELEPHONE (OUTPATIENT)
Dept: GASTROENTEROLOGY | Facility: CLINIC | Age: 67
End: 2022-11-28

## 2022-11-28 NOTE — TELEPHONE ENCOUNTER
It can often take up to 2 weeks after completion of Xifaxan to see good results.  We always defer treatment for bronchitis to primary care provider especially if antibiotics are being considered.  If she ends up going on antibiotic I think she should use [employer] colon health probiotics once daily.

## 2022-11-28 NOTE — TELEPHONE ENCOUNTER
Caller: Aurora Fisher    Relationship: Self    Best call back number:[phone]    What is the best time to reach you: ANY    Who are you requesting to speak with (clinical staff, provider,  specific staff member):ABIGAIL HOLLEY    What was the call regarding: PT WAS TAKING AN ANTIBOTIC THAT HAS CAUSED DIARRHEA AND REALLY BAD CRAMPING     Do you require a callback: YES

## 2022-11-28 NOTE — TELEPHONE ENCOUNTER
Called pt, she states she is still having diarrhea after every meal, approx 3 hours post prandial.  States she took her last dose of Xifaxan today and will start probiotic.      Pt also states she has a cold/cough at present and is afraid of getting bronchitis.  If she does, she needs to know what ATB for her PCP to prescribe.

## 2022-11-30 ENCOUNTER — TELEPHONE (OUTPATIENT)
Dept: GASTROENTEROLOGY | Facility: CLINIC | Age: 67
End: 2022-11-30

## 2022-12-01 ENCOUNTER — TELEPHONE (OUTPATIENT)
Dept: GASTROENTEROLOGY | Facility: CLINIC | Age: 67
End: 2022-12-01

## 2022-12-01 DIAGNOSIS — R19.7 DIARRHEA, UNSPECIFIED TYPE: Primary | ICD-10-CM

## 2022-12-01 DIAGNOSIS — D64.9 ANEMIA, UNSPECIFIED TYPE: ICD-10-CM

## 2022-12-01 NOTE — TELEPHONE ENCOUNTER
Orders placed for labs, per conversation with Christi hess to change gi pcr to gi culture given insurance coverage    vm left for pt to call back    Stool kits placed at the  for her to

## 2022-12-01 NOTE — TELEPHONE ENCOUNTER
Lets have her come in for a CBC, CMP.  Obtain full testing with GI PCR and C. difficile.  Use Pepto-Bismol OTC in the interim.  Okay for as needed Levsin

## 2022-12-01 NOTE — TELEPHONE ENCOUNTER
Caller: Aurora Fisher    Relationship to patient: Self    Best call back number: [phone]    Patient is needing: PATIENT MISSED CALL FROM TEAM AND IS REQUESTING KILLIAN BACK TO ASSIST WITH CURRENT SYMPTOMS.

## 2022-12-01 NOTE — TELEPHONE ENCOUNTER
"Pt is very upset we never got back to her.    She is having horrible diarrhea, reports going 12-15 times a day or more.  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  She is reporting that she has \"stool leaking from rectum with yellow tinted mucus\"    She completed the xifaxan and stated her last dose was Monday. She is concerned \"something is wrong\"    She is wondering if she can take lomotil, has not tried anything over the counter due to fear of constipation.    She is asking if it is ok for her to take levsin, which I have already advised her to take as needed if the cramping comes back.    Please advise  She is scheduled to see you 12/8, she is getting a CT done on 12/6.  Is there anything you would like her to do before this apt?  "

## 2022-12-01 NOTE — TELEPHONE ENCOUNTER
Caller: Aurora Fisher    Relationship: Self    Best call back number:[phone]    What is the best time to reach you: ANY    Who are you requesting to speak with (clinical staff, provider,  specific staff member): AARON CISNEROS       What was the call regarding: PT KEEPS HAVING ABD CRAMPS AND WANTS TO KNOW IF SHE SHOULD TAKE HYCOSCYAMINE OR ?    Do you require a callback: YES

## 2022-12-02 ENCOUNTER — LAB (OUTPATIENT)
Dept: GASTROENTEROLOGY | Facility: CLINIC | Age: 67
End: 2022-12-02

## 2022-12-02 LAB
ALBUMIN SERPL-MCNC: 4.3 G/DL (ref 3.5–5.2)
ALBUMIN/GLOB SERPL: 2.3 G/DL
ALP SERPL-CCNC: 49 U/L (ref 39–117)
ALT SERPL-CCNC: 16 U/L (ref 1–33)
AST SERPL-CCNC: 18 U/L (ref 1–32)
BASOPHILS # BLD AUTO: 0.04 10*3/MM3 (ref 0–0.2)
BASOPHILS NFR BLD AUTO: 0.7 % (ref 0–1.5)
BILIRUB SERPL-MCNC: 0.4 MG/DL (ref 0–1.2)
BUN SERPL-MCNC: 8 MG/DL (ref 8–23)
BUN/CREAT SERPL: 10.3 (ref 7–25)
CALCIUM SERPL-MCNC: 8.8 MG/DL (ref 8.6–10.5)
CHLORIDE SERPL-SCNC: 104 MMOL/L (ref 98–107)
CO2 SERPL-SCNC: 27.1 MMOL/L (ref 22–29)
CREAT SERPL-MCNC: 0.78 MG/DL (ref 0.57–1)
EGFRCR SERPLBLD CKD-EPI 2021: 82.9 ML/MIN/1.73
EOSINOPHIL # BLD AUTO: 0.27 10*3/MM3 (ref 0–0.4)
EOSINOPHIL NFR BLD AUTO: 4.7 % (ref 0.3–6.2)
ERYTHROCYTE [DISTWIDTH] IN BLOOD BY AUTOMATED COUNT: 12.4 % (ref 12.3–15.4)
GLOBULIN SER CALC-MCNC: 1.9 GM/DL
GLUCOSE SERPL-MCNC: 95 MG/DL (ref 65–99)
HCT VFR BLD AUTO: 41.7 % (ref 34–46.6)
HGB BLD-MCNC: 14.3 G/DL (ref 12–15.9)
IMM GRANULOCYTES # BLD AUTO: 0.03 10*3/MM3 (ref 0–0.05)
IMM GRANULOCYTES NFR BLD AUTO: 0.5 % (ref 0–0.5)
LYMPHOCYTES # BLD AUTO: 1.17 10*3/MM3 (ref 0.7–3.1)
LYMPHOCYTES NFR BLD AUTO: 20.2 % (ref 19.6–45.3)
MCH RBC QN AUTO: 30.8 PG (ref 26.6–33)
MCHC RBC AUTO-ENTMCNC: 34.3 G/DL (ref 31.5–35.7)
MCV RBC AUTO: 89.9 FL (ref 79–97)
MONOCYTES # BLD AUTO: 0.61 10*3/MM3 (ref 0.1–0.9)
MONOCYTES NFR BLD AUTO: 10.5 % (ref 5–12)
NEUTROPHILS # BLD AUTO: 3.68 10*3/MM3 (ref 1.7–7)
NEUTROPHILS NFR BLD AUTO: 63.4 % (ref 42.7–76)
NRBC BLD AUTO-RTO: 0 /100 WBC (ref 0–0.2)
PLATELET # BLD AUTO: 293 10*3/MM3 (ref 140–450)
POTASSIUM SERPL-SCNC: 4.3 MMOL/L (ref 3.5–5.2)
PROT SERPL-MCNC: 6.2 G/DL (ref 6–8.5)
RBC # BLD AUTO: 4.64 10*6/MM3 (ref 3.77–5.28)
SODIUM SERPL-SCNC: 144 MMOL/L (ref 136–145)
WBC # BLD AUTO: 5.8 10*3/MM3 (ref 3.4–10.8)

## 2022-12-05 ENCOUNTER — TELEPHONE (OUTPATIENT)
Dept: GASTROENTEROLOGY | Facility: CLINIC | Age: 67
End: 2022-12-05

## 2022-12-05 NOTE — TELEPHONE ENCOUNTER
Provider: CAROLYN    Caller: REJI MOONEY    Relationship to Patient: SELF    Pharmacy: [facility] [phone]    Phone Number: [phone]    Reason for Call: PT STATED THE SHE SEE HER RESULTS THAT SHE HAS C-DIFF AND SHE IS SUFFERING WITH CONSTANT CRAMPING WITH PAIN AND HAD 21 STOOLS 12/4/22 AND HAS BEEN IN BED FOR A WEEK. PT IS READY TO START A ANTIBIOTIC TO HELP.    PT IS MISERABLE AND HAVING LACK OF ENERGY FROM THIS.    EDITED 12:44PM. PT IS REQUESTING ANTIBIOTICS - IF NEED TO BE SEEN EARLIER THAN SCHEDULED APPOINTMENT 12/8/22. PLEASE ADVISE.

## 2022-12-05 NOTE — TELEPHONE ENCOUNTER
PT CALLED AGAIN WITH THE SAME ISSUES. REQUESTS CALL BACK AS SOON AS POSSIBLE. PLEASE SEE PREVIOUS NOTES. PT IS IN SEVERE PAIN, NO ENERGY FROM THE DIARRHEA. REQUESTING ASSISTANCE AS SOON AS POSSIBLE. PT IS MISERABLE.

## 2022-12-05 NOTE — TELEPHONE ENCOUNTER
Caller: Aurora Fisher    Relationship to patient: Self    Best call back number: [phone]    Patient is needing: SAME DAY APPT OR ANTIBIOTIC FOR C DIFF. SHE HAS HAD 5 LOOSE STOOLS THIS MORNING AND IS BASICALLY BEDRIDDEN. SHE NEEDS SOMEONE TO CALL HER ASAP.           She was checked 2 and 1/2 weeks ago and no yeast was found. She can do an e-visit, or she can try OTC monistat as first line.

## 2022-12-06 ENCOUNTER — TELEPHONE (OUTPATIENT)
Dept: GASTROENTEROLOGY | Facility: CLINIC | Age: 67
End: 2022-12-06

## 2022-12-06 ENCOUNTER — HOSPITAL ENCOUNTER (OUTPATIENT)
Dept: CT IMAGING | Facility: HOSPITAL | Age: 67
Discharge: HOME | End: 2022-12-06
Admitting: NURSE PRACTITIONER

## 2022-12-06 ENCOUNTER — OFFICE VISIT (OUTPATIENT)
Dept: GASTROENTEROLOGY | Facility: CLINIC | Age: 67
End: 2022-12-06

## 2022-12-06 VITALS
SYSTOLIC BLOOD PRESSURE: 116 MMHG | DIASTOLIC BLOOD PRESSURE: 79 MMHG | WEIGHT: 147.6 LBS | HEART RATE: 96 BPM | TEMPERATURE: 96.5 F | BODY MASS INDEX: 22.37 KG/M2 | HEIGHT: 68 IN

## 2022-12-06 DIAGNOSIS — A09 DIARRHEA OF INFECTIOUS ORIGIN: ICD-10-CM

## 2022-12-06 DIAGNOSIS — R10.30 LOWER ABDOMINAL PAIN: ICD-10-CM

## 2022-12-06 DIAGNOSIS — A04.72 COLITIS, CLOSTRIDIUM DIFFICILE: Primary | ICD-10-CM

## 2022-12-06 DIAGNOSIS — R19.5 LOOSE STOOLS: ICD-10-CM

## 2022-12-06 PROCEDURE — 99214 OFFICE O/P EST MOD 30 MIN: CPT | Performed by: PHYSICIAN ASSISTANT

## 2022-12-06 PROCEDURE — 0 DIATRIZOATE MEGLUMINE & SODIUM PER 1 ML: Performed by: NURSE PRACTITIONER

## 2022-12-06 PROCEDURE — 25010000002 IOPAMIDOL 61 % SOLUTION: Performed by: NURSE PRACTITIONER

## 2022-12-06 PROCEDURE — 74177 CT ABD & PELVIS W/CONTRAST: CPT

## 2022-12-06 RX ORDER — VANCOMYCIN HYDROCHLORIDE 125 MG/1
125 CAPSULE ORAL 4 TIMES DAILY
Qty: 56 CAPSULE | Refills: 0 | Status: SHIPPED | OUTPATIENT
Start: 2022-12-06 | End: 2022-12-20

## 2022-12-06 RX ADMIN — IOPAMIDOL 100 ML: 612 INJECTION, SOLUTION INTRAVENOUS at 18:42

## 2022-12-06 RX ADMIN — DIATRIZOATE MEGLUMINE AND DIATRIZOATE SODIUM 30 ML: 660; 100 LIQUID ORAL; RECTAL at 17:45

## 2022-12-06 NOTE — TELEPHONE ENCOUNTER
PA approved Approved, Coverage Starts on: 1/1/2022 12:00:00 AM, Coverage Ends on: 12/31/2023 12:00:00 A

## 2022-12-06 NOTE — PROGRESS NOTES
"Chief Complaint  c diff, Abdominal Pain, and Diarrhea    Subjective          History of Present Illness    Aurora Fisher is a  67 y.o. female presents for acute follow-up with abdominal pain and diarrhea.  She is a patient of Dr. Fortune and Christi CISNEROS last seen on 11/16/2022.  She is new to me.    She presented to the office this morning asking to be seen as she is feeling poorly with abdominal pain and diarrhea.  She has 20+ bowel movements per day--worse over the weekend.  Symptoms ongoing since June.  She had stool studies performed last week and these came back positive for C. difficile.  Results not reviewed by provider yet.    She took a course of Xifaxan which did not help her symptoms. Actually symptoms became worse after this.  She denies melena hematochezia.    History of ADHD, asthma, and osteopenia seen by Dr. Fortune in October after she had been treated empirically for suspected diverticulitis.    10/26/2022 sigmoidoscopy performed for diarrhea and suspected postinfectious colitis showed nonbleeding internal hemorrhoids and diverticulosis.  Biopsies of descending colon were unremarkable.    She is a retired RN.    Objective   Vital Signs:   /79   Pulse 96   Temp 96.5 °F (35.8 °C)   Ht 172.7 cm (68\")   Wt 67 kg (147 lb 9.6 oz)   BMI 22.44 kg/m²       Physical Exam  Vitals reviewed.   Constitutional:       General: She is awake. She is not in acute distress.     Appearance: Normal appearance. She is well-developed and well-groomed.   HENT:      Head: Normocephalic.   Pulmonary:      Effort: Pulmonary effort is normal. No respiratory distress.   Skin:     Coloration: Skin is not pale.   Neurological:      Mental Status: She is alert and oriented to person, place, and time.      Gait: Gait is intact.   Psychiatric:         Mood and Affect: Mood normal. Affect is tearful.         Speech: Speech normal.         Behavior: Behavior is cooperative.         Judgment: Judgment normal.      "     Result Review :             Assessment and Plan    Diagnoses and all orders for this visit:    1. Colitis, Clostridium difficile (Primary)    2. Diarrhea of infectious origin    3. Lower abdominal pain    Other orders  -     vancomycin (VANCOCIN) 125 MG capsule; Take 1 capsule by mouth 4 (Four) Times a Day for 14 days.  Dispense: 56 capsule; Refill: 0    Will treat patient with vancomycin for positive C. difficile on stool testing.  If symptoms worsen, she may need to go to the ED.  Patient called insurance company and determined that vancomycin needed prior Auth so my office staff immediately did the prior Auth.  I called patient and confirmed that she did receive the medication today.      Follow Up   Return in about 2 weeks (around 12/20/2022) for Christi and Dr. Fortune.    Dragon dictation used throughout this note.     Marilia Garza PA-C

## 2022-12-08 NOTE — PROGRESS NOTES
Patient seen recently office by Marilia Garza for C. difficile diarrhea.  Do you think findings on the CT represent that?  thoughts?

## 2022-12-09 NOTE — PROGRESS NOTES
Please inform the patient that her CAT scan shows liquid filled colon consistent with recent diagnosis of C. difficile

## 2022-12-12 NOTE — TELEPHONE ENCOUNTER
PATIENT CALLED IN STATED THAT SHE HAD LABS DRAWN CMP TODAY.  TALKED TO LINK AT [employer] AND HE STATED THAT IF THE CHOLESTEROL WAS MEDICALLY NECESSARY THEN [facility] WILL PAY FOR THE CHOLESTEROL TEST IF ITS STATED THAT WAY IN DR MEHTA ORDER.     PATIENT STATED THAT SHE WOULD LEAVE IT UP TO DR SAMPSON IF HE WOULD SUBMIT IT THAT WAY.    PATIENT ALSO STATED THAT SHE NEEDED AN ORDER FOR A GASTROLOGIST, OR WHO WAS THE ORIGINAL REFERRAL WAS SO SHE CAN CALL THEM.    PLEASE ADVISE   [phone]   [facility] [[email or web address]] CARDIOLOGY  [address] [place] [address] Way, [address]  PHONE: [phone]        NAME:  Efe Medellin  :   MRN: [id number]     PCP:  Beulah Banegas MD      SUBJECTIVE:   Efe Medellin is a 77 [facility] [facility] Esteban Pittman MD at [address] [place] [address]  Creighton Cranker, MD at [facility] [facility] - improved, continue oxygen, pulmonary meds per the discretion/direction of [facility]. Chronic obstructive pulmonary disease, unspecified COPD type ([facility] - 

## 2022-12-19 ENCOUNTER — TELEPHONE (OUTPATIENT)
Dept: GASTROENTEROLOGY | Facility: CLINIC | Age: 67
End: 2022-12-19

## 2022-12-19 NOTE — TELEPHONE ENCOUNTER
Returned patient's phone call.   Patient reports she is on day 13 of Vancomycin. She reports 2-3 soft stools a day.       Per Christi:     Please inform the patient that her CAT scan shows liquid filled colon consistent with recent diagnosis of C. Difficile.    Please inform the patient her lab work is normal await stool testing.        Advised patient passing soft stool on day 13 is normal. She states she has more energy and doesn't feel as bad as she did prior to her episode with c.difficle.     Advised to stay on a low residue diet for a week after she completes her Vancomycin to decrease the work load on her gut as she continues to heal.     She states she has Culturelle probiotic to take after she completes her Vancomycin treatment. Advised she may drink 6 oz of Abisai in addition to the probiotic.     Advised of her CT scan results.     Advised if she has a return of symptoms to call the office with an update. She verb understanding of the above.

## 2022-12-29 ENCOUNTER — OFFICE VISIT (OUTPATIENT)
Dept: GASTROENTEROLOGY | Facility: CLINIC | Age: 67
End: 2022-12-29

## 2022-12-29 VITALS
HEART RATE: 77 BPM | BODY MASS INDEX: 22.88 KG/M2 | DIASTOLIC BLOOD PRESSURE: 73 MMHG | OXYGEN SATURATION: 95 % | HEIGHT: 68 IN | TEMPERATURE: 97.7 F | SYSTOLIC BLOOD PRESSURE: 111 MMHG | WEIGHT: 151 LBS

## 2022-12-29 DIAGNOSIS — A04.72 COLITIS, CLOSTRIDIUM DIFFICILE: Primary | ICD-10-CM

## 2022-12-29 PROCEDURE — 99214 OFFICE O/P EST MOD 30 MIN: CPT | Performed by: NURSE PRACTITIONER

## 2022-12-29 RX ORDER — FLUTICASONE PROPIONATE AND SALMETEROL 100; 50 UG/1; UG/1
1 POWDER RESPIRATORY (INHALATION)
COMMUNITY
Start: 2022-12-28 | End: 2023-12-28

## 2022-12-29 RX ORDER — ESTRADIOL 2 MG/1
RING VAGINAL
COMMUNITY
Start: 2022-12-24

## 2022-12-29 NOTE — PROGRESS NOTES
Chief Complaint   Patient presents with   • C. difficile diarrhea       HPI    Aurora Fisher is a  67 y.o. female here for a follow up visit for C. Difficile s/p treatment with vancomycin.    This patient follows myself and Dr. Fortune.    She was seen earlier this month for acute follow-up with abdominal pain and diarrhea.  She was C. difficile positive at that time and treated with vancomycin.    On visit today she is completed antibiotic therapy and reports resolution of abdominal pain.  She is having 2 soft bowel movements a day much improved.  No rectal bleeding.  Continues on probiotics in the form of Culturelle.    No upper GI complaints    10/26/2022 sigmoidoscopy performed for diarrhea and suspected postinfectious colitis showed nonbleeding internal hemorrhoids and diverticulosis.  Biopsies of descending colon were unremarkable.    Past Medical History:   Diagnosis Date   • ADHD (attention deficit hyperactivity disorder)    • Allergic    • Asthma    • Cataract    • Hyperlipidemia    • Osteopenia        Past Surgical History:   Procedure Laterality Date   • APPENDECTOMY     • COLONOSCOPY      2-3 years    • COLONOSCOPY N/A 5/31/2019    Procedure: COLONOSCOPY into cecum;  Surgeon: Sid Menard MD;  Location: [facility] ENDOSCOPY;  Service: Gastroenterology   • SIGMOIDOSCOPY N/A 10/26/2022    Procedure: FLEXIBLE SIGMOIDOSCOPY WITH COLD BIOPSIES;  Surgeon: Ildefonso Fortune MD;  Location: [facility] ENDOSCOPY;  Service: Gastroenterology;  Laterality: N/A;  PRE- CHRONIC DIARRHEA  POST- HEMORRHOIDS, DIVERTICULOSIS       Scheduled Meds:     Continuous Infusions: No current facility-administered medications for this visit.      PRN Meds:     Allergies   Allergen Reactions   • Penicillins Rash   • Sulfa Antibiotics Rash       Social History     Socioeconomic History   • Marital status:    Tobacco Use   • Smoking status: Never   • Smokeless tobacco: Never   Vaping Use   • Vaping Use: Never used    Substance and Sexual Activity   • Alcohol use: Yes     Comment: occasional   • Drug use: No   • Sexual activity: Defer       Family History   Problem Relation Age of Onset   • Colon polyps Mother    • Colon polyps Sister    • Colon cancer Maternal Grandfather    • Malig Hyperthermia Neg Hx        Review of Systems   Constitutional: Negative for activity change, appetite change, fatigue, fever and unexpected weight change.   HENT: Negative for trouble swallowing.    Respiratory: Negative for apnea, cough, choking, chest tightness, shortness of breath and wheezing.    Cardiovascular: Negative for chest pain, palpitations and leg swelling.   Gastrointestinal: Negative for abdominal distention, abdominal pain, anal bleeding, blood in stool, constipation, diarrhea, nausea, rectal pain and vomiting.       Vitals:    12/29/22 0955   BP: 111/73   Pulse: 77   Temp: 97.7 °F (36.5 °C)   SpO2: 95%       Physical Exam  Constitutional:       Appearance: She is well-developed.   Abdominal:      General: Bowel sounds are normal. There is no distension.      Palpations: Abdomen is soft. There is no mass.      Tenderness: There is no abdominal tenderness. There is no guarding.      Hernia: No hernia is present.   Skin:     General: Skin is warm and dry.      Capillary Refill: Capillary refill takes less than 2 seconds.   Neurological:      Mental Status: She is alert and oriented to person, place, and time.   Psychiatric:         Behavior: Behavior normal.     Assessment    Diagnoses and all orders for this visit:    1. Colitis, Clostridium difficile (Primary)  Comments:  Resolved       Plan    Pleasant 67-year-old female status posttreatment vancomycin for + C. difficile stool test reporting resolution of abdominal pain with passage of 2 formed/loose stools a day.  Discussed prevention of recurrence of C. difficile with avoidance of antibiotics unless absolutely necessary  Continue probiotics for least the next 6 months discuss  probiotics that are optimal in the setting of post C. difficile which included Culturelle and Florastor  Patient to follow-up as needed         BLAYNE Oshea  [facility]  [address] [place] [address]  Office: [phone]    I spent 30 minutes caring for Aurora on this date of service. This time includes time spent by me in the following activities: preparing for the visit, reviewing tests, obtaining and/or reviewing a separately obtained history, performing a medically appropriate examination and/or evaluation , counseling and educating the patient/family/caregiver, ordering medications, tests, or procedures, documenting information in the medical record, independently interpreting results and communicating that information with the patient/family/caregiver and care coordination.

## 2023-01-16 ENCOUNTER — TELEPHONE (OUTPATIENT)
Dept: GASTROENTEROLOGY | Facility: CLINIC | Age: 68
End: 2023-01-16
Payer: MEDICARE

## 2023-01-16 NOTE — TELEPHONE ENCOUNTER
Caller: Aurora Fisher    Relationship: Self    Best call back number: [phone]    What does billing need from the patient: PATIENT HAS CALLED SEVERAL TIMES TODAY WANTING AN ESTIMATE FOR HER OFFICE VISIT COMING UP. HUB DIRECTED HER TO CALL HER INSURANCE COMPANY SHE STATED  SHE HAS DONE THAT TWICE. WE ALSO GAVE HER THE BILLING NUMBER [phone] AND SHE STATED THEY COULD NOT ASSIST HER. PLEASE CONTACT PATIENT ASAP

## 2023-01-19 ENCOUNTER — OFFICE VISIT (OUTPATIENT)
Dept: GASTROENTEROLOGY | Facility: CLINIC | Age: 68
End: 2023-01-19
Payer: MEDICARE

## 2023-01-19 VITALS
HEART RATE: 84 BPM | HEIGHT: 68 IN | WEIGHT: 151.4 LBS | SYSTOLIC BLOOD PRESSURE: 97 MMHG | DIASTOLIC BLOOD PRESSURE: 67 MMHG | BODY MASS INDEX: 22.94 KG/M2 | TEMPERATURE: 96.3 F | OXYGEN SATURATION: 97 %

## 2023-01-19 DIAGNOSIS — K52.9 CHRONIC DIARRHEA: Primary | ICD-10-CM

## 2023-01-19 PROCEDURE — 99213 OFFICE O/P EST LOW 20 MIN: CPT | Performed by: INTERNAL MEDICINE

## 2023-01-19 RX ORDER — NORTRIPTYLINE HYDROCHLORIDE 10 MG/1
10 CAPSULE ORAL NIGHTLY
Qty: 30 CAPSULE | Refills: 11 | Status: SHIPPED | OUTPATIENT
Start: 2023-01-19

## 2023-01-19 RX ORDER — SACCHAROMYCES BOULARDII 250 MG
250 CAPSULE ORAL 2 TIMES DAILY
Qty: 60 CAPSULE | Refills: 5 | Status: SHIPPED | OUTPATIENT
Start: 2023-01-19

## 2023-01-19 NOTE — PROGRESS NOTES
Chief Complaint   Patient presents with   • Diarrhea       Aurora Fisher is a  67 y.o. female here for a follow up visit for post C. difficile diarrhea.    HPI     Patient 67-year-old female with history of hyperlipidemia, ADHD and asthma status post acute C. difficile colitis.  Patient reports feeling markedly improved but still has intermittent urgency and soft stools several times a day.  Patient particularly notes morning she wakes up with the feeling she is almost incontinent.  Patient does better the rest of the day though.  Patient here for further recommendations.  Patient denies any fever chills or mucus but has seen some blood that was painless likely hemorrhoidal.    Past Medical History:   Diagnosis Date   • ADHD (attention deficit hyperactivity disorder)    • Allergic    • Asthma    • Cataract    • Hyperlipidemia    • Osteopenia          Current Outpatient Medications:   •  acyclovir (ZOVIRAX) 400 MG tablet, Daily use (Patient taking differently: As Needed.), Disp: 90 tablet, Rfl: 3  •  albuterol sulfate  (90 Base) MCG/ACT inhaler, Inhale 2 puffs Every 6 (Six) Hours As Needed for Wheezing., Disp: 3 inhaler, Rfl: 3  •  calcium carbonate (OS-KILLIAN) 600 MG tablet, Take 600 mg by mouth Daily., Disp: , Rfl:   •  cholecalciferol (VITAMIN D3) 1000 units tablet, Take 1,000 Units by mouth 2 (Two) Times a Day., Disp: , Rfl:   •  Estring 2 MG vaginal ring, , Disp: , Rfl:   •  Fluticasone Furoate-Vilanterol (BREO ELLIPTA IN), Inhale. Likes better than Advair, Disp: , Rfl:   •  Fluticasone-Salmeterol (ADVAIR/WIXELA) 100-50 MCG/ACT DISKUS, Inhale 1 puff., Disp: , Rfl:   •  Hyoscyamine Sulfate SL (Levsin/SL) 0.125 MG sublingual tablet, Place 0.125 mg under the tongue 4 (Four) Times a Day As Needed (cramps)., Disp: 120 each, Rfl: 1  •  loratadine (CLARITIN) 10 MG tablet, Take 10 mg by mouth Daily., Disp: , Rfl:   •  Melatonin 10 MG tablet, Take 1 tablet by mouth At Night As Needed (sleep)., Disp: 30 tablet,  Rfl: 3  •  multivitamin (THERAGRAN) tablet tablet, Take  by mouth., Disp: , Rfl:   •  Prolia 60 MG/ML solution prefilled syringe syringe, , Disp: , Rfl:   •  rosuvastatin (CRESTOR) 10 MG tablet, TAKE 1 TABLET EVERY DAY, Disp: 90 tablet, Rfl: 0  •  nortriptyline (PAMELOR) 10 MG capsule, Take 1 capsule by mouth Every Night., Disp: 30 capsule, Rfl: 11  •  saccharomyces boulardii (Florastor) 250 MG capsule, Take 1 capsule by mouth 2 (Two) Times a Day., Disp: 60 capsule, Rfl: 5  •  terbinafine (lamiSIL) 250 MG tablet, , Disp: , Rfl:     Allergies   Allergen Reactions   • Penicillins Rash   • Sulfa Antibiotics Rash       Social History     Socioeconomic History   • Marital status:    Tobacco Use   • Smoking status: Never   • Smokeless tobacco: Never   Vaping Use   • Vaping Use: Never used   Substance and Sexual Activity   • Alcohol use: Yes     Comment: occasional   • Drug use: No   • Sexual activity: Defer       Family History   Problem Relation Age of Onset   • Colon polyps Mother    • Colon polyps Sister    • Colon cancer Maternal Grandfather    • Malig Hyperthermia Neg Hx        Review of Systems   Constitutional: Negative.    Respiratory: Negative.    Cardiovascular: Negative.    Gastrointestinal: Positive for anal bleeding and diarrhea. Negative for abdominal distention, abdominal pain, constipation, nausea, rectal pain and vomiting.   Musculoskeletal: Negative.    Skin: Negative.    Hematological: Negative.        Vitals:    01/19/23 0836   BP: 97/67   Pulse: 84   Temp: 96.3 °F (35.7 °C)   SpO2: 97%       Physical Exam  Vitals reviewed.   Constitutional:       Appearance: Normal appearance. She is well-developed and normal weight.   HENT:      Head: Normocephalic and atraumatic.   Eyes:      General: No scleral icterus.     Pupils: Pupils are equal, round, and reactive to light.   Cardiovascular:      Rate and Rhythm: Normal rate and regular rhythm.      Heart sounds: Normal heart sounds.   Pulmonary:       Effort: Pulmonary effort is normal. No respiratory distress.      Breath sounds: Normal breath sounds.   Abdominal:      General: Bowel sounds are normal. There is no distension.      Palpations: Abdomen is soft. There is no mass.      Tenderness: There is no abdominal tenderness.      Hernia: No hernia is present.   Skin:     General: Skin is warm and dry.      Coloration: Skin is not jaundiced.      Findings: No rash.   Neurological:      General: No focal deficit present.      Mental Status: She is alert and oriented to person, place, and time.      Cranial Nerves: No cranial nerve deficit.   Psychiatric:         Behavior: Behavior normal.         Thought Content: Thought content normal.         Judgment: Judgment normal.         Telephone on 12/01/2022   Component Date Value Ref Range Status   • WBC 12/02/2022 5.80  3.40 - 10.80 10*3/mm3 Final   • RBC 12/02/2022 4.64  3.77 - 5.28 10*6/mm3 Final   • Hemoglobin 12/02/2022 14.3  12.0 - 15.9 g/dL Final   • Hematocrit 12/02/2022 41.7  34.0 - 46.6 % Final   • MCV 12/02/2022 89.9  79.0 - 97.0 fL Final   • MCH 12/02/2022 30.8  26.6 - 33.0 pg Final   • MCHC 12/02/2022 34.3  31.5 - 35.7 g/dL Final   • RDW 12/02/2022 12.4  12.3 - 15.4 % Final   • Platelets 12/02/2022 293  140 - 450 10*3/mm3 Final   • Neutrophil Rel % 12/02/2022 63.4  42.7 - 76.0 % Final   • Lymphocyte Rel % 12/02/2022 20.2  19.6 - 45.3 % Final   • Monocyte Rel % 12/02/2022 10.5  5.0 - 12.0 % Final   • Eosinophil Rel % 12/02/2022 4.7  0.3 - 6.2 % Final   • Basophil Rel % 12/02/2022 0.7  0.0 - 1.5 % Final   • Neutrophils Absolute 12/02/2022 3.68  1.70 - 7.00 10*3/mm3 Final   • Lymphocytes Absolute 12/02/2022 1.17  0.70 - 3.10 10*3/mm3 Final   • Monocytes Absolute 12/02/2022 0.61  0.10 - 0.90 10*3/mm3 Final   • Eosinophils Absolute 12/02/2022 0.27  0.00 - 0.40 10*3/mm3 Final   • Basophils Absolute 12/02/2022 0.04  0.00 - 0.20 10*3/mm3 Final   • Immature Granulocyte Rel % 12/02/2022 0.5  0.0 - 0.5 % Final   •  Immature Grans Absolute 12/02/2022 0.03  0.00 - 0.05 10*3/mm3 Final   • nRBC 12/02/2022 0.0  0.0 - 0.2 /100 WBC Final   • Glucose 12/02/2022 95  65 - 99 mg/dL Final   • BUN 12/02/2022 8  8 - 23 mg/dL Final   • Creatinine 12/02/2022 0.78  0.57 - 1.00 mg/dL Final   • EGFR Result 12/02/2022 82.9  >60.0 mL/min/1.73 Final   • BUN/Creatinine Ratio 12/02/2022 10.3  7.0 - 25.0 Final   • Sodium 12/02/2022 144  136 - 145 mmol/L Final   • Potassium 12/02/2022 4.3  3.5 - 5.2 mmol/L Final   • Chloride 12/02/2022 104  98 - 107 mmol/L Final   • Total CO2 12/02/2022 27.1  22.0 - 29.0 mmol/L Final   • Calcium 12/02/2022 8.8  8.6 - 10.5 mg/dL Final   • Total Protein 12/02/2022 6.2  6.0 - 8.5 g/dL Final   • Albumin 12/02/2022 4.30  3.50 - 5.20 g/dL Final   • Globulin 12/02/2022 1.9  gm/dL Final   • A/G Ratio 12/02/2022 2.3  g/dL Final   • Total Bilirubin 12/02/2022 0.4  0.0 - 1.2 mg/dL Final   • Alkaline Phosphatase 12/02/2022 49  39 - 117 U/L Final   • AST (SGOT) 12/02/2022 18  1 - 32 U/L Final   • ALT (SGPT) 12/02/2022 16  1 - 33 U/L Final       Diagnoses and all orders for this visit:    1. Chronic diarrhea (Primary)    Other orders  -     saccharomyces boulardii (Florastor) 250 MG capsule; Take 1 capsule by mouth 2 (Two) Times a Day.  Dispense: 60 capsule; Refill: 5  -     nortriptyline (PAMELOR) 10 MG capsule; Take 1 capsule by mouth Every Night.  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 difficile colitis.  Patient reports pain and diarrhea clearly improved but still has urgency at times with soft stools up to twice a day.  Patient still taking single bacteria probiotic is somewhat worried because of an upper respiratory infection she might need antibiotics.  Will prescribe Florastor to be available if she needs antibiotics but recommend she change her probiotics to alternating brands to help broaden the base of bacteria.  Also recommend Pamelor  10 mg at bedtime to help with stool consistency and motility.  We will follow-up clinically.

## 2023-01-20 NOTE — TELEPHONE ENCOUNTER
Caller: Aurora Fisher    Relationship: Self    Best call back number: [phone]    What medication are you requesting: ANTIBIOTIC     What are your current symptoms: BLADDER SPASMS     How long have you been experiencing symptoms: 12 HOURS     Have you had these symptoms before:    [x] Yes  [] No    Have you been treated for these symptoms before:   [] Yes  [x] No    If a prescription is needed, what is your preferred pharmacy and phone number: [facility] [address] AT [facility] [address] ([facility] - [phone] [facility] [phone] [facility]     Additional notes: PT BELIEVES TO HAVE A UTI           
Doxycycline sent to pharmacy  
I tried calling patient says call cannot be completed at this time.  
PATIENT CALLED AGAIN, IS RUNNING A 99.9F FEVER, IF  COULD SEND A PRESCRIPTION TO THE PHARMACY ASAP.  
Patient informed doxycycline sent to pharmacy.  
Per Sola Evans, 
advise  
20-Jan-2023 06:44

## 2023-02-06 NOTE — TELEPHONE ENCOUNTER
I called and spoke with patient.  Patient was requesting an office visit copay be taken off due to she had to make a trip into the office to get an antibiotic.  After review the appointment was requested by patient after a phone call had not be returned within a 24 hour period.  The message had been routed to the provider to request advise on RX needed.  Patient was scheduled the next day.  Patient states she is on a fixed income.  I explained she was agreeable to the follow up visit on 12/29/22 so I couldn't justify a write off.  She verbally understand.  Explained I would speak with staff in re: quicker responses and follow up with patients.

## 2023-03-13 ENCOUNTER — TELEPHONE (OUTPATIENT)
Dept: GASTROENTEROLOGY | Facility: CLINIC | Age: 68
End: 2023-03-13
Payer: MEDICARE

## 2023-03-15 ENCOUNTER — TELEPHONE (OUTPATIENT)
Dept: GASTROENTEROLOGY | Facility: CLINIC | Age: 68
End: 2023-03-15
Payer: MEDICARE

## 2023-03-15 NOTE — TELEPHONE ENCOUNTER
I think she should try the nortriptyline for at least a week and see if her bowel pattern improves.  This is very small dose and very well-tolerated.

## 2023-03-15 NOTE — TELEPHONE ENCOUNTER
Pt called office, states she is still having 2-3 episodes of loose stool per day w/fecal urgency. Pt takes probiotic daily. Pt denies n/v, pain, fever.  States she has hx of c-diff.  Has taken a round of xifaxan without results.  Took a round of vanc with some improvement. Pt has rx for Nortriptyline but did not take d/t fear of side effects.  Advised will send a msg to ARIA Barraza for recommendations.

## 2023-03-16 ENCOUNTER — OFFICE VISIT (OUTPATIENT)
Dept: ORTHOPEDIC SURGERY | Facility: CLINIC | Age: 68
End: 2023-03-16
Payer: MEDICARE

## 2023-03-16 VITALS — TEMPERATURE: 97.4 F | BODY MASS INDEX: 23.25 KG/M2 | WEIGHT: 153.4 LBS | HEIGHT: 68 IN

## 2023-03-16 DIAGNOSIS — R52 PAIN: Primary | ICD-10-CM

## 2023-03-16 DIAGNOSIS — S89.91XA INJURY OF RIGHT KNEE, INITIAL ENCOUNTER: ICD-10-CM

## 2023-03-16 PROCEDURE — 99214 OFFICE O/P EST MOD 30 MIN: CPT | Performed by: ORTHOPAEDIC SURGERY

## 2023-03-16 PROCEDURE — 73562 X-RAY EXAM OF KNEE 3: CPT | Performed by: ORTHOPAEDIC SURGERY

## 2023-03-16 RX ORDER — MELOXICAM 15 MG/1
TABLET ORAL
COMMUNITY
Start: 2022-10-12

## 2023-03-16 RX ORDER — PREDNISONE 10 MG/1
TABLET ORAL
COMMUNITY
Start: 2023-01-20

## 2023-03-16 RX ORDER — BUDESONIDE AND FORMOTEROL FUMARATE DIHYDRATE 160; 4.5 UG/1; UG/1
2 AEROSOL RESPIRATORY (INHALATION)
COMMUNITY
Start: 2023-01-20 | End: 2024-01-20

## 2023-03-16 NOTE — PROGRESS NOTES
Patient Name: Aurora Fisher   YOB: 1955  Referring Primary Care Physician: Emilie Weeks MD  BMI: Body mass index is 23.32 kg/m².    Chief Complaint:    Chief Complaint   Patient presents with   • Right Knee - Follow-up, Pain   • Left Knee - Follow-up, Pain        HPI:     Aurora Fisher is a 67 y.o. female who presents today for evaluation of   Chief Complaint   Patient presents with   • Right Knee - Follow-up, Pain   • Left Knee - Follow-up, Pain   .  Patient is seen back today complaining of essentially right knee pain.  So the left knee is really not bothering her she was in a hurry yesterday caught her toe on the stairs had a fall and struck her right knee.  She says she felt a pop in her patellar area and felt like it almost went in and out.  She now complaining of mainly some posterior lateral pain in the knee.  Is been hard for her to straighten it all the way and she has been limping because she is apprehensive about it      Subjective   Medications:   Home Medications:  Current Outpatient Medications on File Prior to Visit   Medication Sig   • acyclovir (ZOVIRAX) 400 MG tablet Daily use (Patient taking differently: As Needed.)   • albuterol sulfate  (90 Base) MCG/ACT inhaler Inhale 2 puffs Every 6 (Six) Hours As Needed for Wheezing.   • budesonide-formoterol (SYMBICORT) 160-4.5 MCG/ACT inhaler Inhale 2 puffs.   • calcium carbonate (OS-KILLIAN) 600 MG tablet Take 1 tablet by mouth Daily.   • cholecalciferol (VITAMIN D3) 1000 units tablet Take 1 tablet by mouth 2 (Two) Times a Day.   • Estring 2 MG vaginal ring    • Fluticasone Furoate-Vilanterol (BREO ELLIPTA IN) Inhale. Likes better than Advair   • Fluticasone-Salmeterol (ADVAIR/WIXELA) 100-50 MCG/ACT DISKUS Inhale 1 puff.   • Hyoscyamine Sulfate SL (Levsin/SL) 0.125 MG sublingual tablet Place 0.125 mg under the tongue 4 (Four) Times a Day As Needed (cramps).   • loratadine (CLARITIN) 10 MG tablet Take 1 tablet by mouth  Daily.   • Melatonin 10 MG tablet Take 1 tablet by mouth At Night As Needed (sleep).   • meloxicam (MOBIC) 15 MG tablet    • multivitamin (THERAGRAN) tablet tablet Take  by mouth.   • nortriptyline (PAMELOR) 10 MG capsule Take 1 capsule by mouth Every Night.   • Prolia 60 MG/ML solution prefilled syringe syringe    • riFAXIMin (XIFAXAN) 550 MG tablet Take 1 tablet by mouth Every 8 (Eight) Hours.   • rosuvastatin (CRESTOR) 10 MG tablet TAKE 1 TABLET EVERY DAY   • saccharomyces boulardii (Florastor) 250 MG capsule Take 1 capsule by mouth 2 (Two) Times a Day.   • terbinafine (lamiSIL) 250 MG tablet    • predniSONE (DELTASONE) 10 MG tablet      No current facility-administered medications on file prior to visit.     Current Medications:  Scheduled Meds:  Continuous Infusions:No current facility-administered medications for this visit.    PRN Meds:.    I have reviewed the patient's medical history in detail and updated the computerized patient record.  Review and summarization of old records includes:    Past Medical History:   Diagnosis Date   • ADHD (attention deficit hyperactivity disorder)    • Allergic    • Asthma    • Cataract    • Hyperlipidemia    • Osteopenia         Past Surgical History:   Procedure Laterality Date   • APPENDECTOMY     • COLONOSCOPY      2-3 years    • COLONOSCOPY N/A 5/31/2019    Procedure: COLONOSCOPY into cecum;  Surgeon: Sid Menard MD;  Location: [facility] ENDOSCOPY;  Service: Gastroenterology   • SIGMOIDOSCOPY N/A 10/26/2022    Procedure: FLEXIBLE SIGMOIDOSCOPY WITH COLD BIOPSIES;  Surgeon: Ildefonso Fortune MD;  Location: [facility] ENDOSCOPY;  Service: Gastroenterology;  Laterality: N/A;  PRE- CHRONIC DIARRHEA  POST- HEMORRHOIDS, DIVERTICULOSIS        Social History     Occupational History   • Not on file   Tobacco Use   • Smoking status: Never     Passive exposure: Never   • Smokeless tobacco: Never   Vaping Use   • Vaping Use: Never used   Substance and Sexual Activity   • Alcohol  "use: Yes     Comment: occasional   • Drug use: No   • Sexual activity: Defer      Social History     Social History Narrative   • Not on file        Family History   Problem Relation Age of Onset   • Colon polyps Mother    • Colon polyps Sister    • Colon cancer Maternal Grandfather    • Malig Hyperthermia Neg Hx        ROS: 14 point review of systems was performed and all other systems were reviewed and are negative except for documented findings in HPI and today's encounter.     Allergies:   Allergies   Allergen Reactions   • Penicillins Rash   • Sulfa Antibiotics Rash     Constitutional:  Denies fever, shaking or chills   Eyes:  Denies change in visual acuity   HENT:  Denies nasal congestion or sore throat   Respiratory:  Denies cough or shortness of breath   Cardiovascular:  Denies chest pain or severe LE edema   GI:  Denies abdominal pain, nausea, vomiting, bloody stools or diarrhea   Musculoskeletal:  Numbness, tingling, pain, or loss of motor function only as noted above in history of present illness.  : Denies painful urination or hematuria  Integument:  Denies rash, lesion or ulceration   Neurologic:  Denies headache or focal weakness  Endocrine:  Denies lymphadenopathy  Psych:  Denies confusion or change in mental status   Hem:  Denies active bleeding    OBJECTIVE:  Physical Exam: 67 y.o. female  Wt Readings from Last 3 Encounters:   03/16/23 69.6 kg (153 lb 6.4 oz)   01/19/23 68.7 kg (151 lb 6.4 oz)   12/29/22 68.5 kg (151 lb)     Ht Readings from Last 1 Encounters:   03/16/23 172.7 cm (68\")     Body mass index is 23.32 kg/m².  Vitals:    03/16/23 1059   Temp: 97.4 °F (36.3 °C)     Vital signs reviewed.     General Appearance:    Alert, cooperative, in no acute distress                  Eyes: conjunctiva clear  ENT: external ears and nose atraumatic  CV: no peripheral edema  Resp: normal respiratory effort  Skin: no rashes or wounds; normal turgor  Psych: mood and affect appropriate  Lymph: no nodes " appreciated  Neuro: gross sensation intact  Vascular:  Palpable peripheral pulse in noted extremity  Musculoskeletal Extremities: Kirill today shows she limps and she walks with a partially flexed and she says is more out of fear than anything.  She does have some swelling in the right knee versus the left and some anterior bruising where she is tender she is tender posterior laterally but her calf feels soft varus and valgus feels stable but there is a fair amount of guarding in her knee she also feels stable anteriorly and posteriorly with drawer testing once again she may be guarding.  X-ray    Radiology:   X-rays taken the office today for complaints of pain and trauma with a comparison view from the past AP lateral 40 degree PA shows no obvious fracture she does have some moderate arthritic type changes        Assessment:     ICD-10-CM ICD-9-CM   1. Pain  R52 780.96   2. Injury of right knee, initial encounter  S89.91XA 959.7        MDM/Plan:   The diagnosis(es), natural history, pathophysiology and treatment for diagnosis(es) were discussed. Opportunity given and questions answered.  Biomechanics of pertinent body areas discussed.  When appropriate, the use of ambulatory aids discussed.    Inflammation/pain control; with cold, heat, elevation and/or liniments discussed as appropriate  MRI.  MEDICAL RECORDS reviewed from other provider(s) for past and current medical history pertinent to this complaint.    I would get an MRI to further delineate the anatomy because the amount of swelling and the pain that she is having.  If her she ask if we could delay it because she is concerned about cost but with an acute injury and with pain that requires guarding I told her we need to rule out occult fractures ligamentous or meniscal capsular damage.  If she feels 100% by the time the MRI comes back then I told her we could hold on it but would recommend protecting weightbearing until then.  Offered crutches or walker but  she was can run by [facility] and grab a walker because they have in their and she could do ice etc. and some medications to help relieve follow-up according to what we find on MRI or she would let us know if she is doing  3/16/2023    Dictated utilizing Dragon dictation

## 2023-03-20 ENCOUNTER — TELEPHONE (OUTPATIENT)
Dept: ORTHOPEDIC SURGERY | Facility: CLINIC | Age: 68
End: 2023-03-20

## 2023-03-20 NOTE — TELEPHONE ENCOUNTER
Provider: DR ASHLEY YANEZ MD  Caller: REJI MOONEY   Relationship to Patient: PATIENT   Pharmacy: [facility]   Phone Number: [phone]  Reason for Call: FINANCIAL ESTIMATE MRI RIGHT KNEE W/O CONTRAST   When was the patient last seen: 03/16/23

## 2023-03-20 NOTE — TELEPHONE ENCOUNTER
Patient is schedule at [facility] on April 25th however she would like to know the cost she may have to pay out of pocket for MRI.

## 2023-03-20 NOTE — TELEPHONE ENCOUNTER
Provider: DR CELESTINE YANEZ   Caller: SUSANA MOONEY   Relationship to Patient: PATIENT   Pharmacy: [facility]  Phone Number: [phone]  Reason for Call: PATIENT HAS AN ORDER FOR R KNEE MRI W/O CONTRAST.  IN SEPARATE TE PATIENT REQUESTED FINANCIAL ESTIMATE IF PERFORMED AT .  PATIENT WOULD LIKE DOCTOR'S OPINION ON THE QUALITY OF MRI SHOULD SHE DECIDE TO GO OUTSIDE OF ?  When was the patient last seen: 03/16/23

## 2023-03-20 NOTE — TELEPHONE ENCOUNTER
She can get the MRI at an outside facility. It will still be a good MRI but we will need the report faxed to our office with the results if she choose to go outside of [facility]. Thank you!

## 2023-03-31 ENCOUNTER — HOSPITAL ENCOUNTER (OUTPATIENT)
Dept: MRI IMAGING | Facility: HOSPITAL | Age: 68
Discharge: HOME | End: 2023-03-31
Admitting: ORTHOPAEDIC SURGERY
Payer: MEDICARE

## 2023-03-31 DIAGNOSIS — S89.91XA INJURY OF RIGHT KNEE, INITIAL ENCOUNTER: ICD-10-CM

## 2023-03-31 PROCEDURE — 73721 MRI JNT OF LWR EXTRE W/O DYE: CPT

## 2023-04-03 ENCOUNTER — TRANSCRIBE ORDERS (OUTPATIENT)
Dept: ADMINISTRATIVE | Facility: HOSPITAL | Age: 68
End: 2023-04-03
Payer: MEDICARE

## 2023-04-03 DIAGNOSIS — M81.0 SENILE OSTEOPOROSIS: Primary | ICD-10-CM

## 2023-04-04 ENCOUNTER — TELEPHONE (OUTPATIENT)
Dept: ORTHOPEDIC SURGERY | Facility: CLINIC | Age: 68
End: 2023-04-04
Payer: MEDICARE

## 2023-04-04 NOTE — TELEPHONE ENCOUNTER
Recommend to continue protected weight bearing with the walker and to place the foot flat on the ground when placing weight on it. Use OTC Tylenol, ice and elevation to help with pain. Weill need to see Dr. Mcduffie next week for further plan from MRI. Thank you!

## 2023-04-04 NOTE — TELEPHONE ENCOUNTER
Patient notified and patient verbalized understanding. Patient will be seeing [facility] Monday for follow up mri visit.

## 2023-04-04 NOTE — TELEPHONE ENCOUNTER
Can you take a look at this patients MRI and provide recommendations Dr. Mcduffie is out of office this week. Thank you!

## 2023-04-04 NOTE — TELEPHONE ENCOUNTER
Caller: PATIENT    Relationship to patient: SELF     Best call back number: [phone]    Patient is needing:  PATIENT WAS CALLING TO GO OVER HER MRI RESULTS WITH DR. YANEZ. PATIENT COMPLETED THE MRI OF HER RIGHT KNEE ON 03.31.23. PATIENT IS WANTING TO GO OVER HER MRI RESULTS ASAP. THANK YOU!

## 2023-04-07 ENCOUNTER — TELEPHONE (OUTPATIENT)
Dept: GASTROENTEROLOGY | Facility: CLINIC | Age: 68
End: 2023-04-07

## 2023-04-07 NOTE — TELEPHONE ENCOUNTER
UNABLE TO WARM TRANSFER CALL    Caller: Aurora Fisher    Relationship to patient: Self    Best call back number: [phone]    Patient is needing: PATIENT CALLING TO SEE IF SHE CAN TAKE ANYTHING ELSE BESIDES THE NORTRIPTYLINE FOR HER STOOL? SHE SAID THAT IS WORKS SOMEWHAT OK, BUT NOT TOO MUCH RELIEF. ALSO SHE WOULD LIKE TO KNOW CAN SHE TAKE LAMISIL EVEN IF IT COUNTER ACTS WITH THE NORTRIPTYLINE? AND SHE ALSO WOULD LIKE TO KNOW IF HER COLON IS COMPLETELY DAMAGED? AND IF SO, WHAT CAN BE DONE ABOUT IT. PATIENT WOULD LIKE A CALL BACK ASAP. PLEASE CALL PATIENT. THANK YOU

## 2023-04-10 ENCOUNTER — OFFICE VISIT (OUTPATIENT)
Dept: ORTHOPEDIC SURGERY | Facility: CLINIC | Age: 68
End: 2023-04-10
Payer: MEDICARE

## 2023-04-10 VITALS — HEIGHT: 68 IN | WEIGHT: 153 LBS | TEMPERATURE: 98.1 F | BODY MASS INDEX: 23.19 KG/M2

## 2023-04-10 DIAGNOSIS — S89.91XS KNEE INJURY, RIGHT, SEQUELA: ICD-10-CM
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
## 2023-04-10 PROCEDURE — 99213 OFFICE O/P EST LOW 20 MIN: CPT | Performed by: ORTHOPAEDIC SURGERY

## 2023-04-10 NOTE — PROGRESS NOTES
Patient Name: Aurora Fisher   YOB: 1955  Referring Primary Care Physician: Emilie Weeks MD  BMI: Body mass index is 23.26 kg/m².    Chief Complaint:    Chief Complaint   Patient presents with   • Right Knee - Follow-up, Pain        HPI:     Aurora Fisher is a 67 y.o. female who presents today for evaluation of   Chief Complaint   Patient presents with   • Right Knee - Follow-up, Pain   .  Patient follows up on her right knee.  She says is much much better and she can walk on it she has been using a walker.  She got an MRI about a month ago we called and told her to partially weight-bear with a walker      Subjective   Medications:   Home Medications:  Current Outpatient Medications on File Prior to Visit   Medication Sig   • acyclovir (ZOVIRAX) 400 MG tablet Daily use (Patient taking differently: As Needed.)   • albuterol sulfate  (90 Base) MCG/ACT inhaler Inhale 2 puffs Every 6 (Six) Hours As Needed for Wheezing.   • budesonide-formoterol (SYMBICORT) 160-4.5 MCG/ACT inhaler Inhale 2 puffs.   • calcium carbonate (OS-KILLIAN) 600 MG tablet Take 1 tablet by mouth Daily.   • cholecalciferol (VITAMIN D3) 1000 units tablet Take 1 tablet by mouth 2 (Two) Times a Day.   • Estring 2 MG vaginal ring    • Fluticasone Furoate-Vilanterol (BREO ELLIPTA IN) Inhale. Likes better than Advair   • Fluticasone-Salmeterol (ADVAIR/WIXELA) 100-50 MCG/ACT DISKUS Inhale 1 puff.   • Hyoscyamine Sulfate SL (Levsin/SL) 0.125 MG sublingual tablet Place 0.125 mg under the tongue 4 (Four) Times a Day As Needed (cramps).   • loratadine (CLARITIN) 10 MG tablet Take 1 tablet by mouth Daily.   • Melatonin 10 MG tablet Take 1 tablet by mouth At Night As Needed (sleep).   • meloxicam (MOBIC) 15 MG tablet    • multivitamin (THERAGRAN) tablet tablet Take  by mouth.   • nortriptyline (PAMELOR) 10 MG capsule Take 1 capsule by mouth Every Night.   • predniSONE (DELTASONE) 10 MG tablet    • Prolia 60 MG/ML solution  prefilled syringe syringe    • riFAXIMin (XIFAXAN) 550 MG tablet Take 1 tablet by mouth Every 8 (Eight) Hours.   • rosuvastatin (CRESTOR) 10 MG tablet TAKE 1 TABLET EVERY DAY   • saccharomyces boulardii (Florastor) 250 MG capsule Take 1 capsule by mouth 2 (Two) Times a Day.   • terbinafine (lamiSIL) 250 MG tablet      No current facility-administered medications on file prior to visit.     Current Medications:  Scheduled Meds:  Continuous Infusions:No current facility-administered medications for this visit.    PRN Meds:.    I have reviewed the patient's medical history in detail and updated the computerized patient record.  Review and summarization of old records includes:    Past Medical History:   Diagnosis Date   • ADHD (attention deficit hyperactivity disorder)    • Allergic    • Asthma    • Cataract    • Hyperlipidemia    • Osteopenia         Past Surgical History:   Procedure Laterality Date   • APPENDECTOMY     • COLONOSCOPY      2-3 years    • COLONOSCOPY N/A 5/31/2019    Procedure: COLONOSCOPY into cecum;  Surgeon: Sid Menard MD;  Location: [facility] ENDOSCOPY;  Service: Gastroenterology   • SIGMOIDOSCOPY N/A 10/26/2022    Procedure: FLEXIBLE SIGMOIDOSCOPY WITH COLD BIOPSIES;  Surgeon: Ildefonso Fortune MD;  Location: [facility] ENDOSCOPY;  Service: Gastroenterology;  Laterality: N/A;  PRE- CHRONIC DIARRHEA  POST- HEMORRHOIDS, DIVERTICULOSIS        Social History     Occupational History   • Not on file   Tobacco Use   • Smoking status: Never     Passive exposure: Never   • Smokeless tobacco: Never   Vaping Use   • Vaping Use: Never used   Substance and Sexual Activity   • Alcohol use: Yes     Comment: occasional   • Drug use: No   • Sexual activity: Defer      Social History     Social History Narrative   • Not on file        Family History   Problem Relation Age of Onset   • Colon polyps Mother    • Colon polyps Sister    • Colon cancer Maternal Grandfather    • Malig Hyperthermia Neg Hx        ROS:  "14 point review of systems was performed and all other systems were reviewed and are negative except for documented findings in HPI and today's encounter.     Allergies:   Allergies   Allergen Reactions   • Penicillins Rash   • Sulfa Antibiotics Rash     Constitutional:  Denies fever, shaking or chills   Eyes:  Denies change in visual acuity   HENT:  Denies nasal congestion or sore throat   Respiratory:  Denies cough or shortness of breath   Cardiovascular:  Denies chest pain or severe LE edema   GI:  Denies abdominal pain, nausea, vomiting, bloody stools or diarrhea   Musculoskeletal:  Numbness, tingling, pain, or loss of motor function only as noted above in history of present illness.  : Denies painful urination or hematuria  Integument:  Denies rash, lesion or ulceration   Neurologic:  Denies headache or focal weakness  Endocrine:  Denies lymphadenopathy  Psych:  Denies confusion or change in mental status   Hem:  Denies active bleeding    OBJECTIVE:  Physical Exam: 67 y.o. female  Wt Readings from Last 3 Encounters:   04/10/23 69.4 kg (153 lb)   03/16/23 69.6 kg (153 lb 6.4 oz)   01/19/23 68.7 kg (151 lb 6.4 oz)     Ht Readings from Last 1 Encounters:   04/10/23 172.7 cm (68\")     Body mass index is 23.26 kg/m².  Vitals:    04/10/23 1121   Temp: 98.1 °F (36.7 °C)     Vital signs reviewed.     General Appearance:    Alert, cooperative, in no acute distress                  Eyes: conjunctiva clear  ENT: external ears and nose atraumatic  CV: no peripheral edema  Resp: normal respiratory effort  Skin: no rashes or wounds; normal turgor  Psych: mood and affect appropriate  Lymph: no nodes appreciated  Neuro: gross sensation intact  Vascular:  Palpable peripheral pulse in noted extremity  Musculoskeletal Extremities: Exam today shows no unusual swelling she had swelling before she is more tender medially than laterally does not really have Katt's and she is able to stand and walk in a limited " fashion    Radiology:   IMPRESSION:  1. Nondisplaced oblique vertically oriented fracture of the proximal  central tibia extends to the superior central tibial cortex anterior to  the anterior root lateral meniscus without involvement of the articular  cortex.  2. Oblique tear posterior horn and body medial meniscus        Assessment:     ICD-10-CM ICD-9-CM   1. Fall from slip, trip, or stumble, initial encounter  W01.0XXA E885.9   2. Knee injury, right, sequela  S89.91XS 908.9        MDM/Plan:   The diagnosis(es), natural history, pathophysiology and treatment for diagnosis(es) were discussed. Opportunity given and questions answered.  Biomechanics of pertinent body areas discussed.  When appropriate, the use of ambulatory aids discussed.    Inflammation/pain control; with cold, heat, elevation and/or liniments discussed as appropriate  MEDICAL RECORDS reviewed from other provider(s) for past and current medical history pertinent to this complaint.  Wanted to continue protecting her weightbearing is not feeling better she had a tibial plateau fracture is essentially nondisplaced and did not breach the cortices.  She does have meniscal tear which is incidental to what we were suspecting with her acute finding after a fall on stairs continue to protect weightbearing not work out on etc. and to see us back in a month with x-rays and I want an AP lateral 40 degree PA and sunrise view of her knee at that time plan would be to increase her weightbearing should everything look good at that point and gradually increase her activity level    4/10/2023    Dictated utilizing Dragon dictation

## 2023-04-12 NOTE — TELEPHONE ENCOUNTER
Caller: Aurroa Fisher    Relationship: Self    Best call back number:[phone]    What is the best time to reach you: ANY    Who are you requesting to speak with (clinical staff, provider,  specific staff member): CLINICAL    Do you require a callback: YES    PT CALLING TO REPORT STOPPED NORTRIPTYLINE BECAUSE OF INTERACTION WITH LAMISIL. PT STATES MED DID NOT SEEM TO BE WORKING AND STILL HAVE DIARRHEA. PT WOULD LIKE TO KNOW IF THERE IS PERMANENT DAMAGE TO COLON FROM CDIFF?    PLEASE REVIEW AND CONTACT PT

## 2023-04-13 ENCOUNTER — TELEPHONE (OUTPATIENT)
Dept: GASTROENTEROLOGY | Facility: CLINIC | Age: 68
End: 2023-04-13

## 2023-04-13 NOTE — TELEPHONE ENCOUNTER
Caller: Aurora Fisher    Relationship: Self    Best call back number: [phone]    What is the best time to reach you: ANYTIME    Who are you requesting to speak with (clinical staff, provider,  specific staff member): CLINICAL      What was the call regarding: PT REQUESTING TO SPEAK TO A DR OR NURSE     Do you require a callback: YES

## 2023-04-14 ENCOUNTER — TELEPHONE (OUTPATIENT)
Dept: ORTHOPEDIC SURGERY | Facility: CLINIC | Age: 68
End: 2023-04-14

## 2023-04-14 ENCOUNTER — APPOINTMENT (OUTPATIENT)
Dept: OTHER | Facility: HOSPITAL | Age: 68
End: 2023-04-14
Payer: MEDICARE

## 2023-04-14 ENCOUNTER — INFUSION (OUTPATIENT)
Dept: ONCOLOGY | Facility: HOSPITAL | Age: 68
End: 2023-04-14
Payer: MEDICARE

## 2023-04-14 DIAGNOSIS — M81.0 OSTEOPOROSIS, UNSPECIFIED OSTEOPOROSIS TYPE, UNSPECIFIED PATHOLOGICAL FRACTURE PRESENCE: Primary | ICD-10-CM

## 2023-04-14 PROCEDURE — 96372 THER/PROPH/DIAG INJ SC/IM: CPT

## 2023-04-14 PROCEDURE — 25010000002 DENOSUMAB 60 MG/ML SOLUTION PREFILLED SYRINGE: Performed by: OBSTETRICS & GYNECOLOGY

## 2023-04-14 RX ADMIN — DENOSUMAB 60 MG: 60 INJECTION SUBCUTANEOUS at 10:23

## 2023-04-14 NOTE — NURSING NOTE
Arrived  for prolia injection. Indication and side effects reviewed. Denies recent dental work. Labs and medications verified. Prolia administered in left arm without incidence. Instructed to call prescribing MD for any concerns or questions and instructed on how to schedule future appts.  Pt vu and discharged in stable condition.    Ca 3/30/23 is 10.3

## 2023-04-14 NOTE — TELEPHONE ENCOUNTER
I can place an order for a walker and they can pick it up in office if Maggie has one. Before she goes to her injection appointment. Please find out if she would like to do this. Thank you!

## 2023-04-14 NOTE — TELEPHONE ENCOUNTER
Caller: Aurora Fisher    Relationship: Self    Best call back number:     What is the best time to reach you: ANY    Who are you requesting to speak with (clinical staff, provider,  specific staff member): CLINICAL    What was the call regarding: PATIENT IS WORRIED SHE MAY BE DAMAGING HER KNEE WORSE BECAUSE SHE CANT USE HER WALKER SHE HAS.  SHE HAS SOMETHING HER KIDS USED WHEN THEY HURT THEMSELF AND WANTS TO SEE IF ITS OK IF SHE USES THAT.  PLEASE CONTACT HER.  SHE IS SUPPOSED TO GET A PROLIA INJECTION TODAY BUT IS CONCERNED.      Do you require a callback: YES

## 2023-04-14 NOTE — TELEPHONE ENCOUNTER
Unfortunately there is not many other options that would affect her wrists. We still need to keep her partial weight bearing to heal. Thank you!

## 2023-04-14 NOTE — TELEPHONE ENCOUNTER
Called and spoke with patient and patient states that she has an walker but wanted to know if there was any thing else you would recommend since it now bother her wrist as well. Please review and advise

## 2023-04-18 NOTE — TELEPHONE ENCOUNTER
Hub staff attempted to follow warm transfer process and was unsuccessful     Caller: Aurora Fisher    Relationship to patient: Self    Best call back number: [phone]    Patient is needing: PATIENT FOLLOWING UP ON REQUEST TO SPEAK WITH A DR OR NURSE. SHE STATES THAT IT HAS BEEN 2 WEEKS AND SHE STILL HAS NOT HEARD ANYTHING BACK. IN ADDITION TO HER PREVIOUS QUESTIONS SHE ALSO WANTED TO MAKE HER PROVIDER AWARE THAT SHE IS HAVING 2 BOWEL MOVEMENTS A DAY WITH NORTRIPTYLINE, BUT THEY WERE LOOSE AND NOT FORMED. SHE ALSO STATES THAT SHE IS SUPPOSED TO GET A COLONOSCOPY EVERY 5 YRS, BUT SHE WANTED TO KNOW IF IT WAS NEEDED THIS YEAR SINCE SHE RECENTLY HAD A CT SCAN DONE. PLEASE CALL PATIENT BACK ASAP TO ADVISE.

## 2023-04-25 DIAGNOSIS — R19.7 DIARRHEA, UNSPECIFIED TYPE: Primary | ICD-10-CM

## 2023-04-25 NOTE — TELEPHONE ENCOUNTER
Per our conversation have patient come in and complete C. difficile EIA which I have ordered at that point if infection has cleared we can make further recommendation

## 2023-04-25 NOTE — TELEPHONE ENCOUNTER
Called pt and advised of Christi NP's note.  Pt verbalized understanding.    Stool kit placed at .

## 2023-04-25 NOTE — TELEPHONE ENCOUNTER
Called and spoke with patient and advised patient that I have sent a direct message to the providers to reach out to the patient to advise asap.     Message below sent to Dr. Fortune and Christi Charles:    Please see multiple calls in this patients chart. Patient wants a call from APRN or Doctor before the end of the day today to discuss her care. Per Araceli's last documentation patient can discuss at visit with Tong but that appointment had to be cx due to patient fall. Patient has called multiple times for the past 2 weeks and has not received a call back. Can one of you please reach out to the patient to discuss as her questions that are in the telephone encounters need to be decided by a provider? Thank you so much.

## 2023-04-26 ENCOUNTER — TELEPHONE (OUTPATIENT)
Dept: GASTROENTEROLOGY | Facility: CLINIC | Age: 68
End: 2023-04-26

## 2023-04-26 NOTE — TELEPHONE ENCOUNTER
Hub staff attempted to follow warm transfer process and was unsuccessful     Caller: Aurora Fisher    Relationship to patient: Self    Best call back number: [phone] (Mobile)    Patient is needing: PT IS CALLING BECAUSE SHE NEEDS TO DROP OFF HER STOOL SAMPLE, BUT IS REQUESTING SOMEONE COME DOWN TO HER CAR AND GET IT IF SHE ALFORD IN FRONT. SHE IS USING A WALKER AND IS NOT ABLE TO PARK OR WALK UPSTAIRS. PLEASE CALL HER BACK TO PROVIDE THE OPTIONS FOR HER TO DROP OFF STOOL SAMPLE.

## 2023-04-28 ENCOUNTER — TELEPHONE (OUTPATIENT)
Dept: GASTROENTEROLOGY | Facility: CLINIC | Age: 68
End: 2023-04-28

## 2023-04-28 NOTE — TELEPHONE ENCOUNTER
Caller: Aurora Fisher    Relationship: Self    Best call back number: [phone]    What is the best time to reach you: ANYTIME    Who are you requesting to speak with (clinical staff, provider,  specific staff member): FRONT    What was the call regarding: PATIENT WAS WANTING TO MAKE SURE THAT HER STOOL SAMPLE WAS DELIVERED TODAY AND WANTED TO BE CALLED BACK TO CONFIRM. UNABLE TO WARM TRANSFER BOTH NON AND CLINICAL. PLEASE CALL PATIENT BACK AS SOON AS POSSIBLE. SHE ISN'T ABLE TO WALK UP TO THE OFFICE DUE TO HER ARTHRITIS AND HER WALKER. SHE GAVE THE SAMPLE TO ONE OF THE HOSPITAL SECURITY TEAM TO DROP IT OFF AND THE PATIENT JUST WANTS CONFIRMATION.

## 2023-04-29 LAB — C DIFF TOX A+B STL QL IA: NEGATIVE

## 2023-05-01 NOTE — PROGRESS NOTES
Please inform the patient that C. difficile stool test is negative.  No signs of infection.  How is she doing?

## 2023-05-03 ENCOUNTER — OFFICE VISIT (OUTPATIENT)
Dept: ORTHOPEDIC SURGERY | Facility: CLINIC | Age: 68
End: 2023-05-03
Payer: MEDICARE

## 2023-05-03 VITALS — WEIGHT: 153 LBS | BODY MASS INDEX: 23.19 KG/M2 | TEMPERATURE: 97.7 F | HEIGHT: 68 IN

## 2023-05-03 DIAGNOSIS — R52 PAIN: Primary | ICD-10-CM

## 2023-05-03 PROCEDURE — 99213 OFFICE O/P EST LOW 20 MIN: CPT | Performed by: ORTHOPAEDIC SURGERY

## 2023-05-03 NOTE — PROGRESS NOTES
Patient follows up today about 2 months after tibial plateau fracture.  It was not visible on x-ray and essentially found by MRI.  She is complaining of no pain and she started using the walker as it hurts her hands because she puts a lot of pressure on her hands.  Exam today shows good range of motion no unusual swelling nontender to palpation along the proximal tibia.  AP lateral 40 degree PA x-ray right knee taken the office today for fracture follow-up with comparison view shows mild arthritic changes no visible fracture.  (It was seen on MRI) plan is to wean from the walker went over different activities and gradually get into things.  Check her in a month with the x-rays to see how she is doing and if she is feeling weak after couple weeks we could have her call and we can put in for physical therapy

## 2023-05-09 ENCOUNTER — TELEPHONE (OUTPATIENT)
Dept: GASTROENTEROLOGY | Facility: CLINIC | Age: 68
End: 2023-05-09
Payer: MEDICARE

## 2023-05-09 NOTE — TELEPHONE ENCOUNTER
Per BLAYNE Barraza: Please inform the patient that C. difficile stool test is negative.  No signs of infection.  How is she doing?

## 2023-05-09 NOTE — TELEPHONE ENCOUNTER
"PT CALLED IN NUMEROUS TIMES REQUESTING A  NURSE OR DR TO CALL BACK FOR WEEKS NOW. WAS UNABLE TO TRANSFER TO OFFICE CLINICAL NOR NON-CLINICAL LINE 4 DIFFERENT TIMES. PT STATES SHE STILL HAS LOOSE STOOLS AND THE MEDICATION SHE WAS PLACED ON FOR THIS HAS NOT HELPED AND WOULD LIKE A DIFFERENT MEDICATION ASAP. PT WOULD LIKE TO DISCUSS FURTHER IF THIS IS SOMETHING SHE IS GOING TO HAVE TO LIVE WITH THE REST OF HER LIFE? IS THIS SOMETHING PERMEANT? AND DOES SHE HAVE PERMEANT DAMAGE TO HER COLON DUE TO C.DIFF INFECTION BACK IN December?      PT STATES SHE IS AWARE THAT THE RESULTS ARE NEGATIVE FROM HER RECENT STOOL SAMPLE DROP OFF SHE WOULD LIKE TO DISCUSS THE NEXT STEPS. PT IS ALSO CONCERNED ABOUT COLON CANCER AS IT RUNS IN HER FAMILY PT STATES SHE HAD A RECENT CT SCAN DONE AND WAS HOPING THAT WOULD SHOW IF SHE HAD ANY POLYPS SHE WOULD LIKE TO KNOW IF SHE WILL NEED A COLONOSCOPY SOON TO CHECK ,  IF SO SHE WANTS TO SCHEDULE THIS ASAP !     PT ALSO STATES SHE IS VERY FRUSTRATED WITH THE LACK OF COMMUNICATION WITH IN THE OFFICE AND STATES \"DO THEY EVEN CARE ABOUT ME OR ANY OF THEIR PATIENTS?\"      PT STATES SHE HAS BROKEN HER LEG AND HAS NO WAY TO GET TO AN OFFICE APPT ANY TIME SOON      PLEASE CALL PT BACK IN REGARDS TO ALL OF HER QUESTIONS ASAP   "

## 2023-05-09 NOTE — TELEPHONE ENCOUNTER
Returned patient's phone call. Advised patient of Christi's note.   She states she continues to have soft stool 1-2 times a day. Reports at times she has fecal urgency with her bowel movements and denies any blood in her stool. She is concerned her colon is damaged from the bout of c.difficle she had back in December.   She reports taking two different probiotics. Florastor and Culterelle. She alternates between the two everyday.     She reports eating a high fiber diet, drinking Kefir, and eating fermented foods to help keep her gut healthy.     She states she is taking Nortriptyline 10 mg nightly and has seen only a slight improvement in her stool frequency and continues with soft stool.     She also states there is a strong family history of cancer and its been 5 years since her last scope.     Advised will send an update to Dr. Fortune regarding her questions and concerns.   She verb understanding.

## 2023-05-09 NOTE — TELEPHONE ENCOUNTER
"  Hub staff attempted to follow warm transfer process and was unsuccessful     Caller: Aurora Fisher    Relationship to patient: Self    Best call back number: [phone]    Patient is needing: PT CALLED IN NUMEROUS TIMES REQUESTING A  NURSE OR DR TO CALL BACK FOR WEEKS NOW. WAS UNABLE TO TRANSFER TO OFFICE CLINICAL NOR NON-CLINICAL LINE 4 DIFFERENT TIMES. PT STATES SHE STILL HAS LOOSE STOOLS AND THE MEDICATION SHE WAS PLACED ON FOR THIS HAS NOT HELPED AND WOULD LIKE A DIFFERENT MEDICATION ASAP. PT WOULD LIKE TO DISCUSS FURTHER IF THIS IS SOMETHING SHE IS GOING TO HAVE TO LIVE WITH THE REST OF HER LIFE? IS THIS SOMETHING PERMEANT? AND DOES SHE HAVE PERMEANT DAMAGE TO HER COLON DUE TO C.DIFF INFECTION BACK IN December?     PT STATES SHE IS AWARE THAT THE RESULTS ARE NEGATIVE FROM HER RECENT STOOL SAMPLE DROP OFF SHE WOULD LIKE TO DISCUSS THE NEXT STEPS. PT IS ALSO CONCERNED ABOUT COLON CANCER AS IT RUNS IN HER FAMILY PT STATES SHE HAD A RECENT CT SCAN DONE AND WAS HOPING THAT WOULD SHOW IF SHE HAD ANY POLYPS SHE WOULD LIKE TO KNOW IF SHE WILL NEED A COLONOSCOPY SOON TO CHECK ,  IF SO SHE WANTS TO SCHEDULE THIS ASAP !    PT ALSO STATES SHE IS VERY FRUSTRATED WITH THE LACK OF COMMUNICATION WITH IN THE OFFICE AND STATES \"DO THEY EVEN CARE ABOUT ME OR ANY OF THEIR PATIENTS?\"     PT STATES SHE HAS BROKEN HER LEG AND HAS NO WAY TO GET TO AN OFFICE APPT ANY TIME SOON     PLEASE CALL PT BACK IN REGARDS TO ALL OF HER QUESTIONS ASAP         "

## 2023-05-12 NOTE — TELEPHONE ENCOUNTER
Patient called. She questions if Dr. Fortune has answered her questions yet. Advised will send him an update and will get back with her on Monday. She verb understanding.

## 2023-05-12 NOTE — TELEPHONE ENCOUNTER
Patient called. Advised as per Dr. Fortune's note. She verb understanding.    Advised to call or send a my chart message in 10 days to give a health update.     She verb understanding of the above.

## 2023-05-12 NOTE — TELEPHONE ENCOUNTER
Caller: Aurora Fisher    Relationship: Self    Best call back number: [phone]    Who are you requesting to speak with (clinical staff, provider,  specific staff member): JOAO    What was the call regarding: PATIENT STATES WAITING FOR JOAO TO GIVE HER A CALL BACK    Do you require a callback: YES

## 2023-05-17 ENCOUNTER — TELEPHONE (OUTPATIENT)
Dept: ORTHOPEDIC SURGERY | Facility: CLINIC | Age: 68
End: 2023-05-17
Payer: MEDICARE

## 2023-05-17 DIAGNOSIS — S89.91XA INJURY OF RIGHT KNEE, INITIAL ENCOUNTER: Primary | ICD-10-CM

## 2023-05-17 NOTE — TELEPHONE ENCOUNTER
Caller: REJI    Relationship: SELF    Best call back number: [phone]    What orders are you requesting (i.e. lab or imaging): P.T.    In what timeframe would the patient need to come in: ASAP    Where will you receive your lab/imaging services: [facility]    Additional notes:     PT FEEL WEAK AFTER BEING ON BED REST FROM HAVING TO BE NOT WEIGHT BEARING

## 2023-08-17 ENCOUNTER — OFFICE VISIT (OUTPATIENT)
Dept: GASTROENTEROLOGY | Facility: CLINIC | Age: 68
End: 2023-08-17
Payer: MEDICARE

## 2023-08-17 VITALS
TEMPERATURE: 97.1 F | HEART RATE: 67 BPM | SYSTOLIC BLOOD PRESSURE: 97 MMHG | HEIGHT: 68 IN | WEIGHT: 155.6 LBS | BODY MASS INDEX: 23.58 KG/M2 | DIASTOLIC BLOOD PRESSURE: 66 MMHG

## 2023-08-17 DIAGNOSIS — K58.0 IRRITABLE BOWEL SYNDROME WITH DIARRHEA: Primary | ICD-10-CM

## 2023-08-17 PROCEDURE — 1160F RVW MEDS BY RX/DR IN RCRD: CPT | Performed by: INTERNAL MEDICINE

## 2023-08-17 PROCEDURE — 1159F MED LIST DOCD IN RCRD: CPT | Performed by: INTERNAL MEDICINE

## 2023-08-17 PROCEDURE — 99213 OFFICE O/P EST LOW 20 MIN: CPT | Performed by: INTERNAL MEDICINE

## 2023-08-17 RX ORDER — ELUXADOLINE 75 MG/1
1 TABLET, FILM COATED ORAL 2 TIMES DAILY
Qty: 60 TABLET | Refills: 5 | Status: SHIPPED | OUTPATIENT
Start: 2023-08-17

## 2023-08-17 NOTE — PROGRESS NOTES
Chief Complaint   Patient presents with    Diarrhea    anal leakage    Abdominal Pain       Aurora Fisher is a  68 y.o. female here for a follow up visit for diarrhea and anal leakage.    HPI    Patient 58-year-old female with history of hyperlipidemia and colon polyps as well as ADHD presenting for follow-up with ongoing diarrhea and anal leakage.  Patient with some crampy left upper quadrant pain particularly when she is hungry.  She has not responded to antacid therapy but actually feels better when she eats.  The exacerbations typically occur when she is hungry.  Patient reports unable to tolerate nortriptyline and that even 10 mg causes her sleepiness all day.    Past Medical History:   Diagnosis Date    ADHD (attention deficit hyperactivity disorder)     Allergic     Asthma     Cataract     Colon polyp     Diverticulitis of colon     Hyperlipidemia     Osteopenia          Current Outpatient Medications:     acyclovir (ZOVIRAX) 400 MG tablet, Daily use (Patient taking differently: As Needed.), Disp: 90 tablet, Rfl: 3    albuterol sulfate  (90 Base) MCG/ACT inhaler, Inhale 2 puffs Every 6 (Six) Hours As Needed for Wheezing., Disp: 3 inhaler, Rfl: 3    budesonide-formoterol (SYMBICORT) 160-4.5 MCG/ACT inhaler, Inhale 2 puffs., Disp: , Rfl:     calcium carbonate (OS-KILLIAN) 600 MG tablet, Take 1 tablet by mouth Daily., Disp: , Rfl:     cholecalciferol (VITAMIN D3) 1000 units tablet, Take 1 tablet by mouth 2 (Two) Times a Day., Disp: , Rfl:     Estring 2 MG vaginal ring, , Disp: , Rfl:     Fluticasone Furoate-Vilanterol (BREO ELLIPTA IN), Inhale. Likes better than Advair, Disp: , Rfl:     Fluticasone-Salmeterol (ADVAIR/WIXELA) 100-50 MCG/ACT DISKUS, Inhale 1 puff., Disp: , Rfl:     Hyoscyamine Sulfate SL (Levsin/SL) 0.125 MG sublingual tablet, Place 0.125 mg under the tongue 4 (Four) Times a Day As Needed (cramps)., Disp: 120 each, Rfl: 1    loratadine (CLARITIN) 10 MG tablet, Take 1  tablet by mouth Daily., Disp: , Rfl:     Melatonin 10 MG tablet, Take 1 tablet by mouth At Night As Needed (sleep)., Disp: 30 tablet, Rfl: 3    meloxicam (MOBIC) 15 MG tablet, , Disp: , Rfl:     multivitamin (THERAGRAN) tablet tablet, Take  by mouth., Disp: , Rfl:     predniSONE (DELTASONE) 10 MG tablet, , Disp: , Rfl:     Prolia 60 MG/ML solution prefilled syringe syringe, , Disp: , Rfl:     riFAXIMin (XIFAXAN) 550 MG tablet, Take 1 tablet by mouth Every 8 (Eight) Hours., Disp: , Rfl:     rosuvastatin (CRESTOR) 10 MG tablet, TAKE 1 TABLET EVERY DAY, Disp: 90 tablet, Rfl: 0    saccharomyces boulardii (Florastor) 250 MG capsule, Take 1 capsule by mouth 2 (Two) Times a Day., Disp: 60 capsule, Rfl: 5    terbinafine (lamiSIL) 250 MG tablet, , Disp: , Rfl:     Eluxadoline (Viberzi) 75 MG tablet, Take 75 mg by mouth 2 (Two) Times a Day., Disp: 60 tablet, Rfl: 5    Allergies   Allergen Reactions    Fluticasone-Salmeterol Other (See Comments)     Advair has caused Hoarsness twice for patient --once around 2010 and another time around 2023. No similar issue with Breo    Penicillins Rash    Sulfa Antibiotics Rash       Social History     Socioeconomic History    Marital status:    Tobacco Use    Smoking status: Never     Passive exposure: Never    Smokeless tobacco: Never   Vaping Use    Vaping Use: Never used   Substance and Sexual Activity    Alcohol use: Yes     Alcohol/week: 2.0 - 3.0 standard drinks     Types: 2 - 3 Glasses of wine per week     Comment: occasional    Drug use: No    Sexual activity: Defer       Family History   Problem Relation Age of Onset    Colon polyps Mother     Colon polyps Sister     Colon cancer Maternal Grandfather     Malig Hyperthermia Neg Hx        Review of Systems   Constitutional: Negative.    Respiratory: Negative.     Cardiovascular: Negative.    Gastrointestinal:  Positive for abdominal pain and diarrhea. Negative for abdominal distention, anal bleeding,  blood in stool, constipation, nausea, rectal pain and vomiting.   Musculoskeletal: Negative.    Skin: Negative.    Hematological: Negative.      Vitals:    08/17/23 0945   BP: 97/66   Pulse: 67   Temp: 97.1 øF (36.2 øC)       Physical Exam  Vitals reviewed.   Constitutional:       Appearance: Normal appearance. She is well-developed.   HENT:      Head: Normocephalic and atraumatic.   Eyes:      General: No scleral icterus.     Pupils: Pupils are equal, round, and reactive to light.   Pulmonary:      Effort: Pulmonary effort is normal. No respiratory distress.      Breath sounds: Normal breath sounds.   Abdominal:      General: Bowel sounds are normal. There is no distension.      Palpations: Abdomen is soft.      Tenderness: There is no abdominal tenderness.   Skin:     General: Skin is warm and dry.      Coloration: Skin is not jaundiced.      Findings: No rash.   Neurological:      General: No focal deficit present.      Mental Status: She is alert and oriented to person, place, and time.      Cranial Nerves: No cranial nerve deficit.   Psychiatric:         Mood and Affect: Mood normal.         Behavior: Behavior normal.         Thought Content: Thought content normal.     No visits with results within 2 Month(s) from this visit.   Latest known visit with results is:   Orders Only on 04/25/2023   Component Date Value Ref Range Status    C difficile Toxins A+B, EIA 04/28/2023 Negative  Negative Final       Diagnoses and all orders for this visit:    1. Irritable bowel syndrome with diarrhea (Primary)  -     Eluxadoline (Viberzi) 75 MG tablet; Take 75 mg by mouth 2 (Two) Times a Day.  Dispense: 60 tablet; Refill: 5      Patient 68-year-old female with history of hyperlipidemia, colon polyps and ADHD complaining of ongoing diarrhea with anal leakage and left upper quadrant pain.  Patient reports the pain does seem to improve if she eats but unaffected by antacid therapy.  Patient reports particularly occurring when  she is hungry.  Patient status post recent upper respiratory infection seem to correspond to the beginning of this exacerbation.  For now we will give trial of Viberzi as patient reports the nortriptyline caused too much sleepiness.  Await response to therapy.

## 2023-08-21 NOTE — TELEPHONE ENCOUNTER
ALT was slightly elevated at 37 the rest of the liver function tests were normal.  LDL was 120.  This is slightly elevated and needs to watch fatty foods.  Rest of lipid panel was normal.   Yes

## 2023-08-23 ENCOUNTER — TELEPHONE (OUTPATIENT)
Dept: GASTROENTEROLOGY | Facility: CLINIC | Age: 68
End: 2023-08-23
Payer: MEDICARE

## 2023-08-25 ENCOUNTER — TELEPHONE (OUTPATIENT)
Dept: GASTROENTEROLOGY | Facility: CLINIC | Age: 68
End: 2023-08-25

## 2023-08-25 RX ORDER — ALOSETRON HYDROCHLORIDE 0.5 MG/1
0.5 TABLET, FILM COATED ORAL 2 TIMES DAILY
Qty: 60 TABLET | Refills: 11 | Status: SHIPPED | OUTPATIENT
Start: 2023-08-25 | End: 2024-08-24

## 2023-08-25 NOTE — TELEPHONE ENCOUNTER
Caller: Aurora Fisher    Relationship: Self    Best call back number: [phone]    What is the best time to reach you: ANYTIME    Who are you requesting to speak with (clinical staff, provider,  specific staff member): CLINICAL STAFF         What was the call regarding: PT IS WANTING TO SPEAK WITH THE PHARMACIST IN THE OFFICE ABOUT A MEDICATION

## 2023-08-25 NOTE — TELEPHONE ENCOUNTER
Caller: Aurora Fisher    Relationship: Self    Best call back number: [phone]    What medications are you currently taking: SHE ISN'T TAKING VIBERZI DUE TO HER INS NOT COVERING IT  Current Outpatient Medications on File Prior to Visit   Medication Sig Dispense Refill    acyclovir (ZOVIRAX) 400 MG tablet Daily use (Patient taking differently: As Needed.) 90 tablet 3    albuterol sulfate  (90 Base) MCG/ACT inhaler Inhale 2 puffs Every 6 (Six) Hours As Needed for Wheezing. 3 inhaler 3    budesonide-formoterol (SYMBICORT) 160-4.5 MCG/ACT inhaler Inhale 2 puffs.      calcium carbonate (OS-KILLIAN) 600 MG tablet Take 1 tablet by mouth Daily.      cholecalciferol (VITAMIN D3) 1000 units tablet Take 1 tablet by mouth 2 (Two) Times a Day.      Eluxadoline (Viberzi) 75 MG tablet Take 75 mg by mouth 2 (Two) Times a Day. 60 tablet 5    Estring 2 MG vaginal ring       Fluticasone Furoate-Vilanterol (BREO ELLIPTA IN) Inhale. Likes better than Advair      Fluticasone-Salmeterol (ADVAIR/WIXELA) 100-50 MCG/ACT DISKUS Inhale 1 puff.      Hyoscyamine Sulfate SL (Levsin/SL) 0.125 MG sublingual tablet Place 0.125 mg under the tongue 4 (Four) Times a Day As Needed (cramps). 120 each 1    loratadine (CLARITIN) 10 MG tablet Take 1 tablet by mouth Daily.      Melatonin 10 MG tablet Take 1 tablet by mouth At Night As Needed (sleep). 30 tablet 3    meloxicam (MOBIC) 15 MG tablet       multivitamin (THERAGRAN) tablet tablet Take  by mouth.      predniSONE (DELTASONE) 10 MG tablet       Prolia 60 MG/ML solution prefilled syringe syringe       riFAXIMin (XIFAXAN) 550 MG tablet Take 1 tablet by mouth Every 8 (Eight) Hours.      rosuvastatin (CRESTOR) 10 MG tablet TAKE 1 TABLET EVERY DAY 90 tablet 0    saccharomyces boulardii (Florastor) 250 MG capsule Take 1 capsule by mouth 2 (Two) Times a Day. 60 capsule 5    terbinafine (lamiSIL) 250 MG tablet        No current facility-administered medications on file prior to visit.           When did you start taking these medications: PATIENT HASN'T TAKEN VIBERZI YET, BECAUSE HER INSURANCE WILL NOT COVER     Which medication are you concerned about: VIBBRADLY    Who prescribed you this medication: DR. LEON    What are your concerns: SHE IS NEEDING AN ALTERNATIVE RX THAT HER INSURANCE WILL COVER. SHE ISN'T TAKING ANYTHING AND IS STILL HAVING SAME SYMPTOMS AND SHE NEEDS SOMETHING ELSE TO BE CALLED IN FOR HER TODAY. PATIENT SENT A [employer] MESSAGE ON 08/23/23 AND NOBODY HAS REACHED OUT TO HER YET. PLEASE CALL PATIENT BACK ASAP.

## 2023-08-28 ENCOUNTER — TELEPHONE (OUTPATIENT)
Dept: GASTROENTEROLOGY | Facility: CLINIC | Age: 68
End: 2023-08-28
Payer: MEDICARE

## 2023-08-28 NOTE — TELEPHONE ENCOUNTER
PLEASE SUBMIT PA.   BIN#[id number]  PCN: [id number]  CAREHOLDER ID: [id number]    PHONE #  FOR PA (IF NEEDED) : [phone]    TERESA RON

## 2023-08-29 ENCOUNTER — TELEPHONE (OUTPATIENT)
Dept: GASTROENTEROLOGY | Facility: CLINIC | Age: 68
End: 2023-08-29
Payer: MEDICARE

## 2023-08-29 RX ORDER — ALOSETRON HYDROCHLORIDE 0.5 MG/1
0.5 TABLET, FILM COATED ORAL 2 TIMES DAILY
Qty: 60 TABLET | Refills: 11 | Status: SHIPPED | OUTPATIENT
Start: 2023-08-29 | End: 2023-08-31

## 2023-08-29 NOTE — TELEPHONE ENCOUNTER
LAKSHMI, THIS IS THE PT THAT WAS IN THE OFFICE THIS AFTERNOON AND SHE WENT TO THE PHARMACY TO GET THE MEDICATION AND SHE SAID THAT IT WAS ALMOST $200 AND THAT IT WAS TOO EXPENSIVE FOR HER AND THAT SHE DOESN'T WANT TO USE IT BECAUSE SHE DOESN'T HAVE SEVERE DIARRHEA AND SHE IS WANTING TO KNOW IF MAYBE SHE CAN TAKE CARAFATE OR SOMETHING SIMILAR.  I THINK YOU ALREADY HAVE A NOTE ON YOUR DESK TO CALL HER IN THE MORNING BUT SHE SAID THAT YOU CAN CALL THIS AFTERNOON IF YOU HAVE THE TIME.  [phone]

## 2023-08-29 NOTE — TELEPHONE ENCOUNTER
Spoke with patient and informed her the PA denial was sent to the pharmacist and the pharmacist sent in another PA because she stated she felt like it shouldn't have been denied. Informed patient as soon as I hear something I would give her a call.

## 2023-08-29 NOTE — TELEPHONE ENCOUNTER
Spoke with Whitney of pharmacy services with [facility] and was informed that this medication is now approved effective today and ends until further notice.

## 2023-08-29 NOTE — TELEPHONE ENCOUNTER
After calling patient back and letting her know the script was approved. After looking at side effects of patients for medication for older people online she does not want to take alosetron     Patient wants to know if Dr. Fortune would prescribe carafate or nortriptyline. Patient wants to know how dangerous the carafate would be for her age group.     She wants to know if she will have to deal with this for the rest of her life.

## 2023-08-29 NOTE — TELEPHONE ENCOUNTER
CALLER: Aurora Fisher    RELATIONSHIP TO PATIENT: Self    BEST CALL BACK NUMBER: [phone]    CALL REGARDING:    Ms. Fisher is here in office and informed    Prior Authorization NEEDED     1ST AUTOMATIC DENIED for Viberzi Prescription and alosetron (Lotronex) 0.5 MG tablet     2ND Denied Due to Not Having a Enough Information. Medical Necessary    alosetron (Lotronex) 0.5 MG tablet     Would like to know if she can have samples of this prescription until this prescription is approved.    [facility] [id number] - [place], [place] - [address] AT [address] - [phone]  - [phone] FX [[id number]]    Would like to know if you can check with her pharmacy to make sure they have this prescription available.    Leaving Thursday 8/31/2023 to visit New Born Grandson

## 2023-08-29 NOTE — TELEPHONE ENCOUNTER
Hub staff attempted to follow warm transfer process and was unsuccessful     Caller: Aurora Fisher    Relationship to patient: Self    Best call back number: [phone]    Patient is needing: PT CALLING REGARDING DENIAL PA ON PRESCRIPTION. (PREVIOUSLY ENCOUNTER). PLEASE CONTACT PT TO ADVISE. SHE IS GOING OUT OF TOWN TOMORROW AND NEEDS THIS MEDICATION ASAP.

## 2023-08-29 NOTE — TELEPHONE ENCOUNTER
"Prior authorization has been denied.    \"Denied. We are unable to approve your request for this drug. We need the following information from your doctor: For severe diarrhea-predominant irritable bowl syndrome (IBS): chronic IBS symptoms lasting at least 6 months, gastrointestinal tract abnormalities have been ruled out, and inadequate response to one conventional therapy (e.g., antispasmodics, antidepressants, antidiarrheals). We asked your doctor for this information, but it was not sent.\"      Received faxed letter and will fill out and fax back over.  "

## 2023-08-31 RX ORDER — NORTRIPTYLINE HYDROCHLORIDE 10 MG/1
20 CAPSULE ORAL NIGHTLY
Qty: 60 CAPSULE | Refills: 4 | Status: SHIPPED | OUTPATIENT
Start: 2023-08-31

## 2023-10-17 ENCOUNTER — INFUSION (OUTPATIENT)
Dept: ONCOLOGY | Facility: HOSPITAL | Age: 68
End: 2023-10-17
Payer: MEDICARE

## 2023-10-17 VITALS — HEIGHT: 68 IN | RESPIRATION RATE: 15 BRPM | TEMPERATURE: 98.2 F | BODY MASS INDEX: 23.66 KG/M2

## 2023-10-17 DIAGNOSIS — M81.0 OSTEOPOROSIS, UNSPECIFIED OSTEOPOROSIS TYPE, UNSPECIFIED PATHOLOGICAL FRACTURE PRESENCE: Primary | ICD-10-CM

## 2023-10-17 PROCEDURE — 25010000002 DENOSUMAB 60 MG/ML SOLUTION PREFILLED SYRINGE: Performed by: OBSTETRICS & GYNECOLOGY

## 2023-10-17 PROCEDURE — 96372 THER/PROPH/DIAG INJ SC/IM: CPT

## 2023-10-17 RX ADMIN — DENOSUMAB 60 MG: 60 INJECTION SUBCUTANEOUS at 14:55

## 2024-02-21 NOTE — TELEPHONE ENCOUNTER
"Prior authorization has been denied via [facility]. Denial letter to be scanned into patients chart.    \"Denied. This drug is not covered on the formulary. We are denying your request because we do not show that you have tried at least 2 covered drugs that can treat your condition. You have already tried Xifaxan. Other covered drug(s) is/are: loperamide hcl oral capsule 2 mg, alosetron hcl oral tablet 0.5 mg, 1 mg (prior authorization required). We may be able to make an exception to cover this drug. Your doctor will need to send us medical records showing that you tried this drug. If you cannot take the covered drug, your doctor will need to tell us why. Note: Some covered drug(s) may have quantity limits. Please refer to the formulary for details.\"  "
----- Message from Val Pierce RN sent at 8/22/2023  9:17 AM EDT -----  Regarding: FW: medication approval  Contact: [phone]  Could we see if we received a PA for this patients Jeff, Thank you.   ----- Message -----  From: Aurora Fisher  Sent: 8/21/2023  12:04 PM EDT  To: [facility]  Subject: medication approval                              Greetings,  Has my new prescription been approved by my insurance yet?  [facility] pharmacy has not heard anything yet either.   Thx for your help!  Aurora Fisher [phone]    
Eluxadoline PA  Routed to Macarena to submit PA  
Identified pt with two pt identifiers (name and ). Reviewed chart in preparation for visit and have obtained necessary documentation.    Grace Laughlin is a 34 y.o. female Leg Pain (ED Follow up of Acute pain in lower left leg)  .    Vitals:    24 0944   BP: 104/71   Site: Right Upper Arm   Position: Sitting   Cuff Size: Large Adult   Pulse: 97   Resp: 18   Temp: 97.6 °F (36.4 °C)   TempSrc: Oral   SpO2: 98%   Weight: 82.6 kg (182 lb)   Height: 1.753 m (5' 9\")          1. Have you been to the ER, urgent care clinic since your last visit?  Hospitalized since your last visit?  no     2. Have you seen or consulted any other health care providers outside of the [facility] since your last visit?  Include any pap smears or colon screening.  no  
Per Dr. Fortune:     It is always helpful if they would tell us up front.  Lets try the Lotronex 0.5 mg twice daily      Prescription written. Patient notified via my chart.     
Prior authorization has been submitted via [facility].  
Prior authorization has been submitted via M  
Update to Dr. Fortune.   
provider for follow-up on this health maintenance.

## 2024-04-24 ENCOUNTER — LAB (OUTPATIENT)
Dept: OTHER | Facility: HOSPITAL | Age: 69
End: 2024-04-24
Payer: MEDICARE

## 2024-04-24 ENCOUNTER — INFUSION (OUTPATIENT)
Dept: ONCOLOGY | Facility: HOSPITAL | Age: 69
End: 2024-04-24
Payer: MEDICARE

## 2024-04-24 DIAGNOSIS — M81.0 OSTEOPOROSIS, UNSPECIFIED OSTEOPOROSIS TYPE, UNSPECIFIED PATHOLOGICAL FRACTURE PRESENCE: ICD-10-CM

## 2024-04-24 DIAGNOSIS — M81.0 OSTEOPOROSIS, UNSPECIFIED OSTEOPOROSIS TYPE, UNSPECIFIED PATHOLOGICAL FRACTURE PRESENCE: Primary | ICD-10-CM

## 2024-04-24 LAB — CALCIUM SPEC-SCNC: 9.4 MG/DL (ref 8.6–10.5)

## 2024-04-24 PROCEDURE — 25010000002 DENOSUMAB 60 MG/ML SOLUTION PREFILLED SYRINGE: Performed by: OBSTETRICS & GYNECOLOGY

## 2024-04-24 PROCEDURE — 96372 THER/PROPH/DIAG INJ SC/IM: CPT

## 2024-04-24 PROCEDURE — 82310 ASSAY OF CALCIUM: CPT | Performed by: OBSTETRICS & GYNECOLOGY

## 2024-04-24 RX ADMIN — DENOSUMAB 60 MG: 60 INJECTION SUBCUTANEOUS at 10:01

## 2024-10-04 ENCOUNTER — TRANSCRIBE ORDERS (OUTPATIENT)
Dept: ADMINISTRATIVE | Facility: HOSPITAL | Age: 69
End: 2024-10-04
Payer: MEDICARE

## 2024-10-04 DIAGNOSIS — R05.9 COUGH, UNSPECIFIED TYPE: Primary | ICD-10-CM

## 2024-10-21 ENCOUNTER — HOSPITAL ENCOUNTER (OUTPATIENT)
Dept: CT IMAGING | Facility: HOSPITAL | Age: 69
Discharge: HOME | End: 2024-10-21
Admitting: HOSPITALIST
Payer: MEDICARE

## 2024-10-21 DIAGNOSIS — R05.9 COUGH, UNSPECIFIED TYPE: ICD-10-CM

## 2024-10-21 PROCEDURE — 71250 CT THORAX DX C-: CPT

## 2024-11-01 ENCOUNTER — LAB (OUTPATIENT)
Dept: OTHER | Facility: HOSPITAL | Age: 69
End: 2024-11-01
Payer: MEDICARE

## 2024-11-01 ENCOUNTER — INFUSION (OUTPATIENT)
Dept: ONCOLOGY | Facility: HOSPITAL | Age: 69
End: 2024-11-01
Payer: MEDICARE

## 2024-11-01 DIAGNOSIS — M81.0 OSTEOPOROSIS, UNSPECIFIED OSTEOPOROSIS TYPE, UNSPECIFIED PATHOLOGICAL FRACTURE PRESENCE: Primary | ICD-10-CM

## 2024-11-01 DIAGNOSIS — M81.0 OSTEOPOROSIS, UNSPECIFIED OSTEOPOROSIS TYPE, UNSPECIFIED PATHOLOGICAL FRACTURE PRESENCE: ICD-10-CM

## 2024-11-01 LAB — CALCIUM SPEC-SCNC: 9.7 MG/DL (ref 8.6–10.5)

## 2024-11-01 PROCEDURE — 25010000002 DENOSUMAB 60 MG/ML SOLUTION PREFILLED SYRINGE: Performed by: OBSTETRICS & GYNECOLOGY

## 2024-11-01 PROCEDURE — 82310 ASSAY OF CALCIUM: CPT | Performed by: OBSTETRICS & GYNECOLOGY

## 2024-11-01 PROCEDURE — 96372 THER/PROPH/DIAG INJ SC/IM: CPT

## 2024-11-01 RX ADMIN — DENOSUMAB 60 MG: 60 INJECTION SUBCUTANEOUS at 13:00

## 2025-05-22 ENCOUNTER — TRANSCRIBE ORDERS (OUTPATIENT)
Dept: ADMINISTRATIVE | Facility: HOSPITAL | Age: 70
End: 2025-05-22
Payer: MEDICARE

## 2025-05-22 DIAGNOSIS — M81.0 SENILE OSTEOPOROSIS: Primary | ICD-10-CM

## 2025-05-27 DIAGNOSIS — M81.0 OSTEOPOROSIS, UNSPECIFIED OSTEOPOROSIS TYPE, UNSPECIFIED PATHOLOGICAL FRACTURE PRESENCE: Primary | ICD-10-CM

## 2025-05-29 ENCOUNTER — INFUSION (OUTPATIENT)
Dept: ONCOLOGY | Facility: HOSPITAL | Age: 70
End: 2025-05-29
Payer: MEDICARE

## 2025-05-29 ENCOUNTER — LAB (OUTPATIENT)
Dept: OTHER | Facility: HOSPITAL | Age: 70
End: 2025-05-29
Payer: MEDICARE

## 2025-05-29 DIAGNOSIS — M81.0 OSTEOPOROSIS, UNSPECIFIED OSTEOPOROSIS TYPE, UNSPECIFIED PATHOLOGICAL FRACTURE PRESENCE: ICD-10-CM

## 2025-05-29 DIAGNOSIS — M81.0 OSTEOPOROSIS, UNSPECIFIED OSTEOPOROSIS TYPE, UNSPECIFIED PATHOLOGICAL FRACTURE PRESENCE: Primary | ICD-10-CM

## 2025-05-29 LAB — CALCIUM SPEC-SCNC: 10.4 MG/DL (ref 8.6–10.5)

## 2025-05-29 PROCEDURE — 25010000002 DENOSUMAB 60 MG/ML SOLUTION PREFILLED SYRINGE: Performed by: OBSTETRICS & GYNECOLOGY

## 2025-05-29 PROCEDURE — 82310 ASSAY OF CALCIUM: CPT | Performed by: OBSTETRICS & GYNECOLOGY

## 2025-05-29 PROCEDURE — 96372 THER/PROPH/DIAG INJ SC/IM: CPT

## 2025-05-29 RX ADMIN — DENOSUMAB 60 MG: 60 INJECTION SUBCUTANEOUS at 15:09

## 2025-05-29 NOTE — NURSING NOTE
Arrived for prolia injection. Indication and side effects reviewed. Denies recent dental work. Labs and medications verified. Prolia administered in arm without incidence. Instructed to call prescribing MD for any concerns or questions.  Appt card provided with scheduling number and instructed on how to schedule future appts.  Pt vu and discharged in stable condition.

## (undated) DEVICE — CANN NASL CO2 TRULINK W/O2 A/

## (undated) DEVICE — ADAPT CLN BIOGUARD AIR/H2O DISP

## (undated) DEVICE — Device: Brand: DEFENDO AIR/WATER/SUCTION AND BIOPSY VALVE

## (undated) DEVICE — KT ORCA ORCAPOD DISP STRL

## (undated) DEVICE — THE TORRENT IRRIGATION SCOPE CONNECTOR IS USED WITH THE TORRENT IRRIGATION TUBING TO PROVIDE IRRIGATION FLUIDS SUCH AS STERILE WATER DURING GASTROINTESTINAL ENDOSCOPIC PROCEDURES WHEN USED IN CONJUNCTION WITH AN IRRIGATION PUMP (OR ELECTROSURGICAL UNIT).: Brand: TORRENT

## (undated) DEVICE — TUBING, SUCTION, 1/4" X 10', STRAIGHT: Brand: MEDLINE

## (undated) DEVICE — SENSR O2 OXIMAX FNGR A/ 18IN NONSTR

## (undated) DEVICE — SINGLE-USE BIOPSY FORCEPS: Brand: RADIAL JAW 4

## (undated) DEVICE — CANN O2 ETCO2 FITS ALL CONN CO2 SMPL A/ 7IN DISP LF

## (undated) DEVICE — LN SMPL CO2 SHTRM SD STREAM W/M LUER